# Patient Record
Sex: FEMALE | Race: BLACK OR AFRICAN AMERICAN | URBAN - METROPOLITAN AREA
[De-identification: names, ages, dates, MRNs, and addresses within clinical notes are randomized per-mention and may not be internally consistent; named-entity substitution may affect disease eponyms.]

---

## 2022-04-11 ENCOUNTER — HOSPITAL ENCOUNTER (EMERGENCY)
Age: 52
Discharge: HOME | End: 2022-04-12
Attending: EMERGENCY MEDICINE
Payer: COMMERCIAL

## 2022-04-11 ENCOUNTER — APPOINTMENT (OUTPATIENT)
Dept: CT IMAGING | Age: 52
End: 2022-04-11
Payer: COMMERCIAL

## 2022-04-11 DIAGNOSIS — N85.9 LESION OF UTERUS: ICD-10-CM

## 2022-04-11 DIAGNOSIS — N93.9 VAGINAL BLEEDING: Primary | ICD-10-CM

## 2022-04-11 LAB
BACTERIA: ABNORMAL /HPF
BILIRUBIN URINE: ABNORMAL
BLOOD, URINE: ABNORMAL
CLARITY: ABNORMAL
COLOR: YELLOW
EPITHELIAL CELLS, UA: 6 /HPF (ref 0–5)
FINE CASTS, UA: ABNORMAL /LPF (ref 0–2)
GLUCOSE URINE: NEGATIVE MG/DL
HCG(URINE) PREGNANCY TEST: NEGATIVE
HYALINE CASTS: ABNORMAL /LPF (ref 0–2)
INR BLD: 1.08 (ref 0.88–1.12)
KETONES, URINE: 15 MG/DL
LEUKOCYTE ESTERASE, URINE: ABNORMAL
MICROSCOPIC EXAMINATION: YES
NITRITE, URINE: NEGATIVE
PH UA: 6 (ref 5–8)
PROTEIN UA: 100 MG/DL
PROTHROMBIN TIME: 12.2 SEC (ref 9.9–12.7)
RBC UA: >900 /HPF (ref 0–4)
SPECIFIC GRAVITY UA: >=1.03 (ref 1–1.03)
URINE REFLEX TO CULTURE: YES
URINE TYPE: ABNORMAL
UROBILINOGEN, URINE: 0.2 E.U./DL
WBC UA: 40 /HPF (ref 0–5)

## 2022-04-11 PROCEDURE — 80048 BASIC METABOLIC PNL TOTAL CA: CPT

## 2022-04-11 PROCEDURE — 83690 ASSAY OF LIPASE: CPT

## 2022-04-11 PROCEDURE — 36415 COLL VENOUS BLD VENIPUNCTURE: CPT

## 2022-04-11 PROCEDURE — 80076 HEPATIC FUNCTION PANEL: CPT

## 2022-04-11 PROCEDURE — 81001 URINALYSIS AUTO W/SCOPE: CPT

## 2022-04-11 PROCEDURE — 99283 EMERGENCY DEPT VISIT LOW MDM: CPT

## 2022-04-11 PROCEDURE — 85025 COMPLETE CBC W/AUTO DIFF WBC: CPT

## 2022-04-11 PROCEDURE — 87086 URINE CULTURE/COLONY COUNT: CPT

## 2022-04-11 PROCEDURE — 85610 PROTHROMBIN TIME: CPT

## 2022-04-11 PROCEDURE — 84703 CHORIONIC GONADOTROPIN ASSAY: CPT

## 2022-04-12 ENCOUNTER — APPOINTMENT (OUTPATIENT)
Dept: CT IMAGING | Age: 52
End: 2022-04-12
Payer: COMMERCIAL

## 2022-04-12 VITALS
HEART RATE: 76 BPM | RESPIRATION RATE: 21 BRPM | OXYGEN SATURATION: 100 % | TEMPERATURE: 98.6 F | HEIGHT: 69 IN | DIASTOLIC BLOOD PRESSURE: 86 MMHG | WEIGHT: 157.41 LBS | SYSTOLIC BLOOD PRESSURE: 129 MMHG | BODY MASS INDEX: 23.31 KG/M2

## 2022-04-12 LAB
ALBUMIN SERPL-MCNC: 4.3 G/DL (ref 3.4–5)
ALP BLD-CCNC: 50 U/L (ref 40–129)
ALT SERPL-CCNC: 9 U/L (ref 10–40)
ANION GAP SERPL CALCULATED.3IONS-SCNC: 16 MMOL/L (ref 3–16)
AST SERPL-CCNC: 13 U/L (ref 15–37)
BASOPHILS ABSOLUTE: 0 K/UL (ref 0–0.2)
BASOPHILS RELATIVE PERCENT: 0.2 %
BILIRUB SERPL-MCNC: 0.6 MG/DL (ref 0–1)
BILIRUBIN DIRECT: <0.2 MG/DL (ref 0–0.3)
BILIRUBIN, INDIRECT: ABNORMAL MG/DL (ref 0–1)
BUN BLDV-MCNC: 11 MG/DL (ref 7–20)
CALCIUM SERPL-MCNC: 9 MG/DL (ref 8.3–10.6)
CHLORIDE BLD-SCNC: 105 MMOL/L (ref 99–110)
CO2: 18 MMOL/L (ref 21–32)
CREAT SERPL-MCNC: 1.1 MG/DL (ref 0.6–1.1)
EOSINOPHILS ABSOLUTE: 0 K/UL (ref 0–0.6)
EOSINOPHILS RELATIVE PERCENT: 0 %
GFR AFRICAN AMERICAN: >60
GFR NON-AFRICAN AMERICAN: 53
GLUCOSE BLD-MCNC: 111 MG/DL (ref 70–99)
HCT VFR BLD CALC: 36 % (ref 36–48)
HEMATOLOGY PATH CONSULT: NO
HEMOGLOBIN: 11.7 G/DL (ref 12–16)
LIPASE: 15 U/L (ref 13–60)
LYMPHOCYTES ABSOLUTE: 0.6 K/UL (ref 1–5.1)
LYMPHOCYTES RELATIVE PERCENT: 5.6 %
MCH RBC QN AUTO: 22 PG (ref 26–34)
MCHC RBC AUTO-ENTMCNC: 32.5 G/DL (ref 31–36)
MCV RBC AUTO: 67.9 FL (ref 80–100)
MONOCYTES ABSOLUTE: 0.3 K/UL (ref 0–1.3)
MONOCYTES RELATIVE PERCENT: 2.5 %
NEUTROPHILS ABSOLUTE: 10 K/UL (ref 1.7–7.7)
NEUTROPHILS RELATIVE PERCENT: 91.7 %
PDW BLD-RTO: 26.2 % (ref 12.4–15.4)
PLATELET # BLD: 208 K/UL (ref 135–450)
PMV BLD AUTO: 9 FL (ref 5–10.5)
POTASSIUM REFLEX MAGNESIUM: 3.9 MMOL/L (ref 3.5–5.1)
RBC # BLD: 5.3 M/UL (ref 4–5.2)
SODIUM BLD-SCNC: 139 MMOL/L (ref 136–145)
TOTAL PROTEIN: 7.9 G/DL (ref 6.4–8.2)
URINE CULTURE, ROUTINE: NORMAL
WBC # BLD: 11 K/UL (ref 4–11)

## 2022-04-12 PROCEDURE — 74177 CT ABD & PELVIS W/CONTRAST: CPT

## 2022-04-12 PROCEDURE — 6360000004 HC RX CONTRAST MEDICATION: Performed by: EMERGENCY MEDICINE

## 2022-04-12 RX ADMIN — IOPAMIDOL 75 ML: 755 INJECTION, SOLUTION INTRAVENOUS at 00:48

## 2022-04-12 ASSESSMENT — ENCOUNTER SYMPTOMS
COLOR CHANGE: 0
EYE ITCHING: 0
EYE DISCHARGE: 0
CONSTIPATION: 0
ABDOMINAL PAIN: 1
SHORTNESS OF BREATH: 0
VOMITING: 0
COUGH: 0

## 2022-04-12 NOTE — ED PROVIDER NOTES
[address] [facility]      Pt Name: Randy Burton  MRN: [id number]  [place] 1970  Date of evaluation: 4/11/2022  Provider: An Ruiz MD    CHIEF COMPLAINT       Chief Complaint   Patient presents with    Vaginal Bleeding       HISTORY OF PRESENT ILLNESS    Choco Lemus is a 54 y.o. female who presents to the emergency department with abdominal pain and vaginal bleeding. Has been going on for months. States she could not take it anymore so came to the emergency room. Has an appointment with her OB/GYN tomorrow. Has a scheduled hysterectomy for polyps and fibroids in June. Pain currently 3 out of 10 crampy in nature in her lower abdomen. No fevers or chills. No urinary complaints. No back pain. No nausea or vomiting. No constipation or diarrhea. Bleeding is intermittent. Currently denies bleeding. No other associated symptoms. Nursing Notes were reviewed. Including nursing noted for FM, Surgical History, Past Medical History, Social History, vitals, and allergies; agree with all. REVIEW OF SYSTEMS       Review of Systems   Constitutional: Negative for diaphoresis and unexpected weight change. HENT: Negative for congestion and dental problem. Eyes: Negative for discharge and itching. Respiratory: Negative for cough and shortness of breath. Cardiovascular: Negative for chest pain and leg swelling. Gastrointestinal: Positive for abdominal pain. Negative for constipation and vomiting. Endocrine: Negative for cold intolerance and heat intolerance. Genitourinary: Positive for vaginal bleeding. Negative for vaginal discharge and vaginal pain. Musculoskeletal: Negative for neck pain and neck stiffness. Skin: Negative for color change and pallor. Neurological: Negative for tremors and weakness. Psychiatric/Behavioral: Negative for agitation and behavioral problems.        Except as noted above the remainder of the review of systems was reviewed and negative. PAST MEDICAL HISTORY     Past Medical History:   Diagnosis Date    Anemia     Depression 9/16/2014    Headache(784.0)     Seizures ([address]        SURGICAL HISTORY       Past Surgical History:   Procedure Laterality Date    KNEE SURGERY Left     TUBAL LIGATION         CURRENT MEDICATIONS       Previous Medications    ALBUTEROL SULFATE  (90 BASE) MCG/ACT INHALER    Inhale 2 puffs into the lungs every 6 hours as needed for Wheezing    FE FUM-FA-B [id number] (CENTRATEX PO)    Take 1 capsule by mouth 2 times daily    LAMOTRIGINE (LAMICTAL) 100 MG TABLET    Take 250 mg by mouth 2 times daily    LORAZEPAM (ATIVAN) 1 MG TABLET    Take 1 mg by mouth every 6 hours as needed (seizure/anxiety)     MAGNESIUM OXIDE (MAG-OX) 400 MG TABLET    Take 400 mg by mouth 2 times daily    OXCARBAZEPINE (TRILEPTAL) 600 MG TABLET    Take 1,200 mg by mouth 2 times daily    PANTOPRAZOLE (PROTONIX) 40 MG TABLET    Take 1 tablet by mouth every morning (before breakfast)    RIBOFLAVIN 100 MG CAPS    Take 100 mg by mouth 2 times daily       ALLERGIES     Patient has no known allergies.     FAMILY HISTORY        Family History   Problem Relation Age of Onset    Arthritis Mother     Early Death Mother     High Blood Pressure Father     Diabetes Maternal Aunt     Arthritis Maternal Grandmother     Asthma Maternal Grandmother     Heart Disease Maternal Grandmother     High Blood Pressure Maternal Grandmother     Asthma Maternal Grandfather     Heart Disease Maternal Grandfather     High Blood Pressure Maternal Grandfather     Heart Disease Paternal Grandmother     Heart Disease Paternal Grandfather     Cancer Maternal Cousin     Learning Disabilities Maternal Cousin     Cancer Paternal Cousin        SOCIAL HISTORY       Social History     Socioeconomic History    Marital status: Single     Spouse name: Not on file    Number of children: Not on file    Years of education: Not on file    Highest education level: Not on file   Occupational History    Not on file   Tobacco Use    Smoking status: Never Smoker    Smokeless tobacco: Never Used   Substance and Sexual Activity    Alcohol use: No     Alcohol/week: 0.0 standard drinks    Drug use: No    Sexual activity: Yes     Partners: Male   Other Topics Concern    Not on file   Social History Narrative    Not on file     Social Determinants of Health     Financial Resource Strain:     Difficulty of Paying Living Expenses: Not on file   Food Insecurity:     Worried About Running Out of Food in the Last Year: Not on file    Leyla of Food in the Last Year: Not on file   Transportation Needs:     Lack of Transportation (Medical): Not on file    Lack of Transportation (Non-Medical):  Not on file   Physical Activity:     Days of Exercise per Week: Not on file    Minutes of Exercise per Session: Not on file   Stress:     Feeling of Stress : Not on file   Social Connections:     Frequency of Communication with Friends and Family: Not on file    Frequency of Social Gatherings with Friends and Family: Not on file    Attends Baptism Services: Not on file    Active Member of [address] [place] [address] or Organizations: Not on file    Attends Club or Organization Meetings: Not on file    Marital Status: Not on file   Intimate Partner Violence:     Fear of Current or Ex-Partner: Not on file    Emotionally Abused: Not on file    Physically Abused: Not on file    Sexually Abused: Not on file   Housing Stability:     Unable to Pay for Housing in the Last Year: Not on file    Number of [place] in the Last Year: Not on file    Unstable Housing in the Last Year: Not on file       PHYSICAL EXAM       ED Triage Vitals   BP Temp Temp src Pulse Resp SpO2 Height Weight   04/11/22 2216 04/11/22 2216 -- 04/11/22 2216 04/12/22 0101 04/11/22 2216 04/11/22 2216 04/11/22 2216   129/86 98.6 °F (37 °C)  89 21 98 % 5' 9\" (1.753 m) 157 lb 6.5 oz (71.4 kg)       Physical Exam  Vitals and nursing note reviewed. Constitutional:       General: She is not in acute distress. Appearance: She is well-developed. She is not ill-appearing, toxic-appearing or diaphoretic. HENT:      Head: Normocephalic and atraumatic. Right Ear: External ear normal.      Left Ear: External ear normal.   Eyes:      General:         Right eye: No discharge. Left eye: No discharge. Conjunctiva/sclera: Conjunctivae normal.      Pupils: Pupils are equal, round, and reactive to light. Cardiovascular:      Rate and Rhythm: Normal rate and regular rhythm. Heart sounds: No murmur heard. Pulmonary:      Effort: Pulmonary effort is normal. No respiratory distress. Breath sounds: Normal breath sounds. No wheezing or rales. Abdominal:      General: Bowel sounds are normal. There is no distension. Palpations: Abdomen is soft. There is no mass. Tenderness: There is no abdominal tenderness. There is no guarding or rebound. Genitourinary:     Comments: Deferred  Musculoskeletal:         General: No deformity. Normal range of motion. Cervical back: Normal range of motion and neck supple. Skin:     General: Skin is warm. Findings: No erythema or rash. Neurological:      Mental Status: She is alert and oriented to person, place, and time. She is not disoriented. Cranial Nerves: No cranial nerve deficit. Motor: No atrophy or abnormal muscle tone. Coordination: Coordination normal.   Psychiatric:         Behavior: Behavior normal.         Thought Content:  Thought content normal.         DIAGNOSTIC RESULTS     EKG: All EKG's are interpreted by the Emergency Department Physician who either signs or Co-signs this chart in the absence of acardiologist.    None    RADIOLOGY:   Non-plain film images such as CT, Ultrasoundand MRI are read by the radiologist. Plain radiographic images are visualized and preliminarily interpreted by the emergency physician with the below findings:    Impression   Heterogeneous and markedly hyperdense uterus, which may be due to presence of   fibroids; however, other neoplasms such as lymphoma is sarcoma cannot be   excluded.  Pelvic ultrasound is recommended for further evaluation.       Suggestion of a 0.5 cm stone in the incompletely distended urinary bladder. ED BEDSIDE ULTRASOUND:   Performed by ED Physician - none    LABS:  Labs Reviewed   CBC WITH AUTO DIFFERENTIAL - Abnormal; Notable for the following components:       Result Value    RBC 5.30 (*)     Hemoglobin 11.7 (*)     MCV 67.9 (*)     MCH 22.0 (*)     RDW 26.2 (*)     Neutrophils Absolute 10.0 (*)     Lymphocytes Absolute 0.6 (*)     All other components within normal limits   BASIC METABOLIC PANEL W/ REFLEX TO MG FOR LOW K - Abnormal; Notable for the following components:    CO2 18 (*)     Glucose 111 (*)     GFR Non- 53 (*)     All other components within normal limits   HEPATIC FUNCTION PANEL - Abnormal; Notable for the following components:    ALT 9 (*)     AST 13 (*)     All other components within normal limits   URINALYSIS WITH REFLEX TO CULTURE - Abnormal; Notable for the following components:    Clarity, UA TURBID (*)     Bilirubin Urine SMALL (*)     Ketones, Urine 15 (*)     Blood, Urine LARGE (*)     Protein,  (*)     Leukocyte Esterase, Urine TRACE (*)     All other components within normal limits   MICROSCOPIC URINALYSIS - Abnormal; Notable for the following components:    Hyaline Casts, UA 3-5 (*)     Bacteria, UA 1+ (*)     WBC, UA 40 (*)     RBC, UA >900 (*)     Epithelial Cells, UA 6 (*)     All other components within normal limits   CULTURE, URINE   PROTIME-INR   LIPASE   PREGNANCY, URINE       All other labs were withinnormal range or not returned as of this dictation.     EMERGENCY DEPARTMENT COURSE and DIFFERENTIAL DIAGNOSIS/MDM:     PMH, Surgical Hx, FH, Social Hx reviewed by myself (ETOH usage, Tobacco usage, Drug usage reviewed by myself, no pertinent Hx)- No Pertinent Hx     Old records were reviewed by me    59-year-old with vaginal bleeding and vaginal pain. CT shows uterine lesion concerning for fibroid versus mass. Patient had a ablation in the past.  She has an appointment with her OB/GYN tomorrow she has a scheduled hysterectomy in the next 2 months. Her hemoglobin is stable. Minimal vaginal bleeding at this time. Vitals reassuring. Discussed calling her OB/GYN tomorrow/going to her appointment for further outpatient assessment. Strict return precautions. I estimate there is LOW risk for Sepsis, MI, Stroke, Tamponade, PTX, Toxicity or other life threatening etiology thus I consider the discharge disposition reasonable. The patient is at low risk for mortality based on demographic, history and clinical factors. Given the best available information and clinical assessment, I estimate the risk of hospitalization to be greater than risk of treatment at home. I have explained to the patient that the risk could rapidly change, given precautions for return and instructions. Explained to patient that the risk for mortality is low based on demographic, history and clinical factors. I discussed with patient the results of evaluation in the ED, diagnosis, care, and prognosis. The plan is to discharge to home. Patient is in agreement with plan and questions have been answered. I also discussed with patient the reasons which may require a return visit and the importance of follow-up care. The patient is well-appearing, nontoxic, and improved at the time of discharge. Patient agrees to call to arrange follow-up care as directed. Patient understands to return immediately for worsening/change in symptoms. CRITICAL CARE TIME   Total Critical Caretime was 12 minutes, excluding separately reportable procedures.   There was a high probability of clinically significant/life threatening deterioration in the patient's condition which required my urgent intervention. PROCEDURES:  Unlessotherwise noted below, none    FINAL IMPRESSION      1. Vaginal bleeding    2.  Lesion of uterus          DISPOSITION/PLAN   DISPOSITION Decision To Discharge 04/12/2022 01:22:39 AM    PATIENT REFERRED TO:  Please see OB in 1-2 days  Recommend follow up 1-2 days with your OB/GYN          DISCHARGE MEDICATIONS:  New Prescriptions    No medications on file          (Please note that portions ofthis note were completed with a voice recognition program.  Efforts were made to edit the dictations but occasionally words are mis-transcribed.)    Chiqui Ghotra MD(electronically signed)  Attending Emergency Physician        Chiqui Ghotra MD  04/12/22 0527

## 2022-11-06 ENCOUNTER — HOSPITAL ENCOUNTER (INPATIENT)
Age: 52
LOS: 1 days | Discharge: ANOTHER HOSPITAL | DRG: 053 | End: 2022-11-06
Attending: STUDENT IN AN ORGANIZED HEALTH CARE EDUCATION/TRAINING PROGRAM | Admitting: FAMILY MEDICINE
Payer: MEDICAID

## 2022-11-06 ENCOUNTER — APPOINTMENT (OUTPATIENT)
Dept: CT IMAGING | Age: 52
DRG: 053 | End: 2022-11-06
Payer: MEDICAID

## 2022-11-06 DIAGNOSIS — R51.9 NONINTRACTABLE HEADACHE, UNSPECIFIED CHRONICITY PATTERN, UNSPECIFIED HEADACHE TYPE: Primary | ICD-10-CM

## 2022-11-06 DIAGNOSIS — R20.0 LEFT ARM NUMBNESS: ICD-10-CM

## 2022-11-06 PROBLEM — R56.9 SEIZURES (HCC): Status: ACTIVE | Noted: 2022-11-06

## 2022-11-06 LAB
A/G RATIO: 1.6 (ref 1.1–2.2)
ALBUMIN SERPL-MCNC: 4.1 G/DL (ref 3.4–5)
ALP BLD-CCNC: 56 U/L (ref 40–129)
ALT SERPL-CCNC: <5 U/L (ref 10–40)
ANION GAP SERPL CALCULATED.3IONS-SCNC: 12 MMOL/L (ref 3–16)
AST SERPL-CCNC: 9 U/L (ref 15–37)
BACTERIA: ABNORMAL /HPF
BASOPHILS ABSOLUTE: 0 K/UL (ref 0–0.2)
BASOPHILS RELATIVE PERCENT: 0.6 %
BILIRUB SERPL-MCNC: 0.3 MG/DL (ref 0–1)
BILIRUBIN URINE: NEGATIVE
BLOOD, URINE: ABNORMAL
BUN BLDV-MCNC: 11 MG/DL (ref 7–20)
CALCIUM SERPL-MCNC: 8.8 MG/DL (ref 8.3–10.6)
CHLORIDE BLD-SCNC: 106 MMOL/L (ref 99–110)
CLARITY: CLEAR
CO2: 23 MMOL/L (ref 21–32)
COLOR: YELLOW
CREAT SERPL-MCNC: 1.1 MG/DL (ref 0.6–1.1)
D DIMER: 0.44 UG/ML FEU (ref 0–0.6)
EKG ATRIAL RATE: 74 BPM
EKG DIAGNOSIS: NORMAL
EKG P AXIS: 48 DEGREES
EKG P-R INTERVAL: 148 MS
EKG Q-T INTERVAL: 406 MS
EKG QRS DURATION: 80 MS
EKG QTC CALCULATION (BAZETT): 450 MS
EKG R AXIS: 56 DEGREES
EKG T AXIS: 71 DEGREES
EKG VENTRICULAR RATE: 74 BPM
EOSINOPHILS ABSOLUTE: 0.1 K/UL (ref 0–0.6)
EOSINOPHILS RELATIVE PERCENT: 1.4 %
EPITHELIAL CELLS, UA: 7 /HPF (ref 0–5)
GFR SERPL CREATININE-BSD FRML MDRD: >60 ML/MIN/{1.73_M2}
GLUCOSE BLD-MCNC: 74 MG/DL (ref 70–99)
GLUCOSE BLD-MCNC: 75 MG/DL (ref 70–99)
GLUCOSE BLD-MCNC: 87 MG/DL (ref 70–99)
GLUCOSE BLD-MCNC: 94 MG/DL (ref 70–99)
GLUCOSE URINE: NEGATIVE MG/DL
HCT VFR BLD CALC: 35.5 % (ref 36–48)
HEMOGLOBIN: 11.3 G/DL (ref 12–16)
HYALINE CASTS: 0 /LPF (ref 0–8)
KEPPRA DOSE AMT: ABNORMAL
KEPPRA: 76.7 UG/ML (ref 6–46)
KETONES, URINE: NEGATIVE MG/DL
LEUKOCYTE ESTERASE, URINE: NEGATIVE
LYMPHOCYTES ABSOLUTE: 2.4 K/UL (ref 1–5.1)
LYMPHOCYTES RELATIVE PERCENT: 41.3 %
MCH RBC QN AUTO: 22.5 PG (ref 26–34)
MCHC RBC AUTO-ENTMCNC: 31.7 G/DL (ref 31–36)
MCV RBC AUTO: 71 FL (ref 80–100)
MICROSCOPIC EXAMINATION: YES
MONOCYTES ABSOLUTE: 0.5 K/UL (ref 0–1.3)
MONOCYTES RELATIVE PERCENT: 8.1 %
NEUTROPHILS ABSOLUTE: 2.9 K/UL (ref 1.7–7.7)
NEUTROPHILS RELATIVE PERCENT: 48.6 %
NITRITE, URINE: NEGATIVE
PDW BLD-RTO: 17 % (ref 12.4–15.4)
PERFORMED ON: NORMAL
PH UA: 6.5 (ref 5–8)
PLATELET # BLD: 202 K/UL (ref 135–450)
PMV BLD AUTO: 8.4 FL (ref 5–10.5)
POTASSIUM SERPL-SCNC: 3.6 MMOL/L (ref 3.5–5.1)
PROTEIN UA: ABNORMAL MG/DL
RBC # BLD: 5.01 M/UL (ref 4–5.2)
RBC UA: 14 /HPF (ref 0–4)
SODIUM BLD-SCNC: 141 MMOL/L (ref 136–145)
SPECIFIC GRAVITY UA: 1.07 (ref 1–1.03)
TOTAL PROTEIN: 6.7 G/DL (ref 6.4–8.2)
TROPONIN: <0.01 NG/ML
URINE REFLEX TO CULTURE: ABNORMAL
URINE TYPE: ABNORMAL
UROBILINOGEN, URINE: 1 E.U./DL
WBC # BLD: 5.9 K/UL (ref 4–11)
WBC UA: 2 /HPF (ref 0–5)

## 2022-11-06 PROCEDURE — 36415 COLL VENOUS BLD VENIPUNCTURE: CPT

## 2022-11-06 PROCEDURE — 96374 THER/PROPH/DIAG INJ IV PUSH: CPT

## 2022-11-06 PROCEDURE — 6360000002 HC RX W HCPCS: Performed by: PHYSICIAN ASSISTANT

## 2022-11-06 PROCEDURE — 93005 ELECTROCARDIOGRAM TRACING: CPT | Performed by: PHYSICIAN ASSISTANT

## 2022-11-06 PROCEDURE — 6360000004 HC RX CONTRAST MEDICATION: Performed by: PHYSICIAN ASSISTANT

## 2022-11-06 PROCEDURE — 6370000000 HC RX 637 (ALT 250 FOR IP): Performed by: FAMILY MEDICINE

## 2022-11-06 PROCEDURE — 81001 URINALYSIS AUTO W/SCOPE: CPT

## 2022-11-06 PROCEDURE — 80053 COMPREHEN METABOLIC PANEL: CPT

## 2022-11-06 PROCEDURE — 94760 N-INVAS EAR/PLS OXIMETRY 1: CPT

## 2022-11-06 PROCEDURE — 70450 CT HEAD/BRAIN W/O DYE: CPT

## 2022-11-06 PROCEDURE — 6370000000 HC RX 637 (ALT 250 FOR IP): Performed by: STUDENT IN AN ORGANIZED HEALTH CARE EDUCATION/TRAINING PROGRAM

## 2022-11-06 PROCEDURE — 84484 ASSAY OF TROPONIN QUANT: CPT

## 2022-11-06 PROCEDURE — 85379 FIBRIN DEGRADATION QUANT: CPT

## 2022-11-06 PROCEDURE — 2580000003 HC RX 258: Performed by: PHYSICIAN ASSISTANT

## 2022-11-06 PROCEDURE — 99291 CRITICAL CARE FIRST HOUR: CPT | Performed by: INTERNAL MEDICINE

## 2022-11-06 PROCEDURE — 6360000002 HC RX W HCPCS: Performed by: INTERNAL MEDICINE

## 2022-11-06 PROCEDURE — 6360000002 HC RX W HCPCS: Performed by: STUDENT IN AN ORGANIZED HEALTH CARE EDUCATION/TRAINING PROGRAM

## 2022-11-06 PROCEDURE — 99285 EMERGENCY DEPT VISIT HI MDM: CPT

## 2022-11-06 PROCEDURE — 80175 DRUG SCREEN QUAN LAMOTRIGINE: CPT

## 2022-11-06 PROCEDURE — 80177 DRUG SCRN QUAN LEVETIRACETAM: CPT

## 2022-11-06 PROCEDURE — G0378 HOSPITAL OBSERVATION PER HR: HCPCS

## 2022-11-06 PROCEDURE — 93010 ELECTROCARDIOGRAM REPORT: CPT | Performed by: INTERNAL MEDICINE

## 2022-11-06 PROCEDURE — 85025 COMPLETE CBC W/AUTO DIFF WBC: CPT

## 2022-11-06 PROCEDURE — 2000000000 HC ICU R&B

## 2022-11-06 PROCEDURE — 96375 TX/PRO/DX INJ NEW DRUG ADDON: CPT

## 2022-11-06 PROCEDURE — 70496 CT ANGIOGRAPHY HEAD: CPT

## 2022-11-06 RX ORDER — DIPHENHYDRAMINE HYDROCHLORIDE 50 MG/ML
12.5 INJECTION INTRAMUSCULAR; INTRAVENOUS ONCE
Status: COMPLETED | OUTPATIENT
Start: 2022-11-06 | End: 2022-11-06

## 2022-11-06 RX ORDER — LORAZEPAM 2 MG/ML
2 INJECTION INTRAMUSCULAR ONCE
Status: COMPLETED | OUTPATIENT
Start: 2022-11-06 | End: 2022-11-06

## 2022-11-06 RX ORDER — CLOBAZAM 10 MG/1
10 TABLET ORAL NIGHTLY
Status: DISCONTINUED | OUTPATIENT
Start: 2022-11-06 | End: 2022-11-06 | Stop reason: SDUPTHER

## 2022-11-06 RX ORDER — ACETAMINOPHEN 650 MG/1
650 SUPPOSITORY RECTAL EVERY 4 HOURS PRN
Status: DISCONTINUED | OUTPATIENT
Start: 2022-11-06 | End: 2022-11-07 | Stop reason: HOSPADM

## 2022-11-06 RX ORDER — KETOROLAC TROMETHAMINE 15 MG/ML
15 INJECTION, SOLUTION INTRAMUSCULAR; INTRAVENOUS ONCE
Status: DISCONTINUED | OUTPATIENT
Start: 2022-11-06 | End: 2022-11-07 | Stop reason: HOSPADM

## 2022-11-06 RX ORDER — ENOXAPARIN SODIUM 100 MG/ML
40 INJECTION SUBCUTANEOUS DAILY
Status: DISCONTINUED | OUTPATIENT
Start: 2022-11-06 | End: 2022-11-07 | Stop reason: HOSPADM

## 2022-11-06 RX ORDER — METOCLOPRAMIDE HYDROCHLORIDE 5 MG/ML
5 INJECTION INTRAMUSCULAR; INTRAVENOUS ONCE
Status: COMPLETED | OUTPATIENT
Start: 2022-11-06 | End: 2022-11-06

## 2022-11-06 RX ORDER — LORAZEPAM 2 MG/ML
1 INJECTION INTRAMUSCULAR ONCE
Status: COMPLETED | OUTPATIENT
Start: 2022-11-06 | End: 2022-11-06

## 2022-11-06 RX ORDER — DULOXETIN HYDROCHLORIDE 30 MG/1
30 CAPSULE, DELAYED RELEASE ORAL DAILY
COMMUNITY

## 2022-11-06 RX ORDER — POLYETHYLENE GLYCOL 3350 17 G/17G
17 POWDER, FOR SOLUTION ORAL DAILY PRN
Status: DISCONTINUED | OUTPATIENT
Start: 2022-11-06 | End: 2022-11-07 | Stop reason: HOSPADM

## 2022-11-06 RX ORDER — MAGNESIUM SULFATE 1 G/100ML
1000 INJECTION INTRAVENOUS ONCE
Status: DISCONTINUED | OUTPATIENT
Start: 2022-11-06 | End: 2022-11-07 | Stop reason: HOSPADM

## 2022-11-06 RX ORDER — LAMOTRIGINE 100 MG/1
300 TABLET ORAL 2 TIMES DAILY
Status: DISCONTINUED | OUTPATIENT
Start: 2022-11-06 | End: 2022-11-07 | Stop reason: HOSPADM

## 2022-11-06 RX ORDER — PANTOPRAZOLE SODIUM 40 MG/1
40 TABLET, DELAYED RELEASE ORAL
Status: DISCONTINUED | OUTPATIENT
Start: 2022-11-06 | End: 2022-11-07 | Stop reason: HOSPADM

## 2022-11-06 RX ORDER — BACLOFEN 10 MG/1
5 TABLET ORAL 2 TIMES DAILY
Status: DISCONTINUED | OUTPATIENT
Start: 2022-11-06 | End: 2022-11-07 | Stop reason: HOSPADM

## 2022-11-06 RX ORDER — ATORVASTATIN CALCIUM 80 MG/1
80 TABLET, FILM COATED ORAL NIGHTLY
Status: DISCONTINUED | OUTPATIENT
Start: 2022-11-06 | End: 2022-11-07 | Stop reason: HOSPADM

## 2022-11-06 RX ORDER — CLOBAZAM 10 MG/1
10 TABLET ORAL NIGHTLY
COMMUNITY

## 2022-11-06 RX ORDER — ACETAMINOPHEN 500 MG
1000 TABLET ORAL EVERY 8 HOURS PRN
Status: DISCONTINUED | OUTPATIENT
Start: 2022-11-06 | End: 2022-11-07 | Stop reason: HOSPADM

## 2022-11-06 RX ORDER — 0.9 % SODIUM CHLORIDE 0.9 %
1000 INTRAVENOUS SOLUTION INTRAVENOUS ONCE
Status: COMPLETED | OUTPATIENT
Start: 2022-11-06 | End: 2022-11-06

## 2022-11-06 RX ORDER — LORAZEPAM 1 MG/1
1 TABLET ORAL EVERY 6 HOURS PRN
Status: DISCONTINUED | OUTPATIENT
Start: 2022-11-06 | End: 2022-11-06

## 2022-11-06 RX ORDER — CLOBAZAM 10 MG/1
10 TABLET ORAL NIGHTLY
Status: DISCONTINUED | OUTPATIENT
Start: 2022-11-06 | End: 2022-11-07 | Stop reason: HOSPADM

## 2022-11-06 RX ORDER — LORAZEPAM 2 MG/ML
0.5 INJECTION INTRAMUSCULAR EVERY 4 HOURS PRN
Status: DISCONTINUED | OUTPATIENT
Start: 2022-11-06 | End: 2022-11-06

## 2022-11-06 RX ORDER — LORAZEPAM 2 MG/ML
2 INJECTION INTRAMUSCULAR EVERY 4 HOURS PRN
Status: DISCONTINUED | OUTPATIENT
Start: 2022-11-06 | End: 2022-11-07 | Stop reason: HOSPADM

## 2022-11-06 RX ORDER — ACETAMINOPHEN 325 MG/1
650 TABLET ORAL EVERY 4 HOURS PRN
Status: DISCONTINUED | OUTPATIENT
Start: 2022-11-06 | End: 2022-11-06 | Stop reason: SDUPTHER

## 2022-11-06 RX ORDER — LEVETIRACETAM 500 MG/1
2000 TABLET ORAL 2 TIMES DAILY
Status: DISCONTINUED | OUTPATIENT
Start: 2022-11-06 | End: 2022-11-07 | Stop reason: HOSPADM

## 2022-11-06 RX ORDER — OXCARBAZEPINE 300 MG/1
1200 TABLET, FILM COATED ORAL 2 TIMES DAILY
Status: DISCONTINUED | OUTPATIENT
Start: 2022-11-06 | End: 2022-11-06

## 2022-11-06 RX ORDER — ASPIRIN 300 MG/1
300 SUPPOSITORY RECTAL DAILY
Status: DISCONTINUED | OUTPATIENT
Start: 2022-11-06 | End: 2022-11-07 | Stop reason: HOSPADM

## 2022-11-06 RX ORDER — LEVETIRACETAM 1000 MG/1
2000 TABLET ORAL 2 TIMES DAILY
COMMUNITY

## 2022-11-06 RX ORDER — VITAMIN B COMPLEX
2000 TABLET ORAL DAILY
Status: DISCONTINUED | OUTPATIENT
Start: 2022-11-06 | End: 2022-11-07 | Stop reason: HOSPADM

## 2022-11-06 RX ORDER — MULTIVITAMIN WITH IRON
1 TABLET ORAL 2 TIMES DAILY
Status: DISCONTINUED | OUTPATIENT
Start: 2022-11-06 | End: 2022-11-07 | Stop reason: HOSPADM

## 2022-11-06 RX ORDER — LANOLIN ALCOHOL/MO/W.PET/CERES
1000 CREAM (GRAM) TOPICAL DAILY
Status: DISCONTINUED | OUTPATIENT
Start: 2022-11-06 | End: 2022-11-07 | Stop reason: HOSPADM

## 2022-11-06 RX ORDER — ONDANSETRON 2 MG/ML
4 INJECTION INTRAMUSCULAR; INTRAVENOUS EVERY 6 HOURS PRN
Status: DISCONTINUED | OUTPATIENT
Start: 2022-11-06 | End: 2022-11-07 | Stop reason: HOSPADM

## 2022-11-06 RX ORDER — DULOXETIN HYDROCHLORIDE 30 MG/1
30 CAPSULE, DELAYED RELEASE ORAL DAILY
Status: DISCONTINUED | OUTPATIENT
Start: 2022-11-06 | End: 2022-11-07 | Stop reason: HOSPADM

## 2022-11-06 RX ORDER — ASPIRIN 81 MG/1
81 TABLET ORAL DAILY
Status: DISCONTINUED | OUTPATIENT
Start: 2022-11-06 | End: 2022-11-07 | Stop reason: HOSPADM

## 2022-11-06 RX ADMIN — LORAZEPAM 1 MG: 2 INJECTION INTRAMUSCULAR; INTRAVENOUS at 01:00

## 2022-11-06 RX ADMIN — LEVETIRACETAM 2000 MG: 500 TABLET, FILM COATED ORAL at 07:55

## 2022-11-06 RX ADMIN — DULOXETINE HYDROCHLORIDE 30 MG: 30 CAPSULE, DELAYED RELEASE ORAL at 07:56

## 2022-11-06 RX ADMIN — LAMOTRIGINE 300 MG: 100 TABLET ORAL at 20:51

## 2022-11-06 RX ADMIN — BACLOFEN 5 MG: 10 TABLET ORAL at 20:40

## 2022-11-06 RX ADMIN — METOCLOPRAMIDE 5 MG: 5 INJECTION, SOLUTION INTRAMUSCULAR; INTRAVENOUS at 01:00

## 2022-11-06 RX ADMIN — ACETAMINOPHEN 1000 MG: 500 TABLET ORAL at 20:55

## 2022-11-06 RX ADMIN — ATORVASTATIN CALCIUM 80 MG: 80 TABLET, FILM COATED ORAL at 20:40

## 2022-11-06 RX ADMIN — LORAZEPAM 2 MG: 2 INJECTION INTRAMUSCULAR; INTRAVENOUS at 10:58

## 2022-11-06 RX ADMIN — SODIUM CHLORIDE 1000 ML: 9 INJECTION, SOLUTION INTRAVENOUS at 00:56

## 2022-11-06 RX ADMIN — LAMOTRIGINE 300 MG: 100 TABLET ORAL at 07:56

## 2022-11-06 RX ADMIN — IOPAMIDOL 75 ML: 755 INJECTION, SOLUTION INTRAVENOUS at 01:16

## 2022-11-06 RX ADMIN — CLOBAZAM 10 MG: 10 TABLET ORAL at 20:40

## 2022-11-06 RX ADMIN — LORAZEPAM 2 MG: 2 INJECTION INTRAMUSCULAR; INTRAVENOUS at 17:19

## 2022-11-06 RX ADMIN — LEVETIRACETAM 2000 MG: 500 TABLET, FILM COATED ORAL at 20:42

## 2022-11-06 RX ADMIN — ASPIRIN 81 MG: 81 TABLET, COATED ORAL at 07:55

## 2022-11-06 RX ADMIN — THERA TABS 1 TABLET: TAB at 20:41

## 2022-11-06 RX ADMIN — BACLOFEN 5 MG: 10 TABLET ORAL at 14:35

## 2022-11-06 RX ADMIN — DIPHENHYDRAMINE HYDROCHLORIDE 12.5 MG: 50 INJECTION, SOLUTION INTRAMUSCULAR; INTRAVENOUS at 01:01

## 2022-11-06 ASSESSMENT — PAIN DESCRIPTION - DESCRIPTORS
DESCRIPTORS: THROBBING
DESCRIPTORS: ACHING

## 2022-11-06 ASSESSMENT — ENCOUNTER SYMPTOMS
VOMITING: 0
COLOR CHANGE: 0
SHORTNESS OF BREATH: 0
ABDOMINAL PAIN: 0

## 2022-11-06 ASSESSMENT — PAIN - FUNCTIONAL ASSESSMENT
PAIN_FUNCTIONAL_ASSESSMENT: 0-10
PAIN_FUNCTIONAL_ASSESSMENT: NONE - DENIES PAIN

## 2022-11-06 ASSESSMENT — PAIN DESCRIPTION - PAIN TYPE: TYPE: CHRONIC PAIN

## 2022-11-06 ASSESSMENT — PAIN DESCRIPTION - LOCATION
LOCATION: HEAD
LOCATION: HEAD;NECK;SHOULDER;ARM

## 2022-11-06 ASSESSMENT — PAIN SCALES - GENERAL
PAINLEVEL_OUTOF10: 0
PAINLEVEL_OUTOF10: 6
PAINLEVEL_OUTOF10: 10

## 2022-11-06 ASSESSMENT — PAIN DESCRIPTION - FREQUENCY: FREQUENCY: CONTINUOUS

## 2022-11-06 NOTE — ED PROVIDER NOTES
[address] [facility]      Pt Name: Charmayne Browning  MRN: [id number]  [place] 1970  Date of evaluation: 11/6/2022  Provider: МАРИЯ Crowe    This patient was not seen and evaluated by the attending physician No att. providers found. CHIEF COMPLAINT       Chief Complaint   Patient presents with    Headache    Arm Pain       CRITICAL CARE TIME   I performed a total Critical Care time of 15 minutes, excluding separately reportable procedures. There was a high probability of clinically significant/life threatening deterioration in the patient's condition which required my urgent intervention. Not limited to multiple reexaminations, discussions with attending physician and consultants. HISTORY OF PRESENT ILLNESS  (Location/Symptom, Timing/Onset, Context/Setting, Quality, Duration, Modifying Factors, Severity.)   Choco Hernandez is a 50 y.o. female who presents to the emergency department via EMS from home where she lives alone. I also spoke on speaker phone with the patient's Tarethan Tawanda \"Tash\" per the patient's request as she is very familiar with the patient's medical history. The patient is on Lamictal and Keppra and Zonegran with a history of epilepsy. The patient's aunt states that sometimes she has seizures and does not know it and a lot of time she has seizures at night. She will have a headache prior to the seizure and/or after the seizures. She tells me the last seizure she observed was about 2 weeks ago describes \"generalized jerking\" typical of her prior seizures. Patient tells me that since Tuesday she has been having a headache and numb tingling sensation in left upper extremity. She tells me the pain in her head which is described as at the top of her head is typical of her prior migraines but has not had one in years. She denies pain in the shoulder or chest.  She is not short of breath. She denies feeling weakness.   No prior cardiac history. No history of diabetes or hyperlipidemia. No history of hypertension. Nursing Notes were reviewed and I agree. REVIEW OF SYSTEMS    (2-9 systems for level 4, 10 or more for level 5)     Review of Systems   Constitutional:  Negative for chills and fever. Respiratory:  Negative for shortness of breath. Cardiovascular:  Negative for chest pain. Gastrointestinal:  Negative for abdominal pain and vomiting. Musculoskeletal:  Positive for arthralgias. Negative for neck stiffness. Skin:  Negative for color change, rash and wound. Neurological:  Positive for numbness and headaches. Negative for seizures and weakness. Psychiatric/Behavioral:  Negative for agitation, behavioral problems and confusion. Except as noted above the remainder of the review of systems was reviewed and negative. PAST MEDICAL HISTORY         Diagnosis Date    Anemia     Depression 9/16/2014    Headache(784.0)     Seizures ([facility] [address]        SURGICAL HISTORY           Procedure Laterality Date    KNEE SURGERY Left     TUBAL LIGATION         CURRENT MEDICATIONS       Previous Medications    ALBUTEROL SULFATE  (90 BASE) MCG/ACT INHALER    Inhale 2 puffs into the lungs every 6 hours as needed for Wheezing    FE FUM-FA-B [id number] (CENTRATEX PO)    Take 1 capsule by mouth 2 times daily    LAMOTRIGINE (LAMICTAL) 100 MG TABLET    Take 250 mg by mouth 2 times daily    LORAZEPAM (ATIVAN) 1 MG TABLET    Take 1 mg by mouth every 6 hours as needed (seizure/anxiety)     MAGNESIUM OXIDE (MAG-OX) 400 MG TABLET    Take 400 mg by mouth 2 times daily    OXCARBAZEPINE (TRILEPTAL) 600 MG TABLET    Take 1,200 mg by mouth 2 times daily    PANTOPRAZOLE (PROTONIX) 40 MG TABLET    Take 1 tablet by mouth every morning (before breakfast)    RIBOFLAVIN 100 MG CAPS    Take 100 mg by mouth 2 times daily       ALLERGIES     Patient has no known allergies.     FAMILY HISTORY           Problem Relation Age of Onset Arthritis Mother     Early Death Mother     High Blood Pressure Father     Diabetes Maternal Aunt     Arthritis Maternal Grandmother     Asthma Maternal Grandmother     Heart Disease Maternal Grandmother     High Blood Pressure Maternal Grandmother     Asthma Maternal Grandfather     Heart Disease Maternal Grandfather     High Blood Pressure Maternal Grandfather     Heart Disease Paternal Grandmother     Heart Disease Paternal Grandfather     Cancer Maternal Cousin     Learning Disabilities Maternal Cousin     Cancer Paternal Cousin      Family Status   Relation Name Status    Mother  (Not Specified)    Father  (Not Specified)    MAunt  (Not Specified)    MGM  (Not Specified)    MGF  (Not Specified)    PGM  (Not Specified)    PGF  (Not Specified)    MCousin  (Not Specified)    PCousin  (Not Specified)        SOCIAL HISTORY      reports that she has never smoked. She has never used smokeless tobacco. She reports that she does not drink alcohol and does not use drugs. PHYSICAL EXAM    (up to 7 for level 4, 8 or more for level 5)     ED Triage Vitals [11/06/22 0015]   BP Temp Temp Source Heart Rate Resp SpO2 Height Weight   110/74 98.4 °F (36.9 °C) Oral 85 16 99 % -- 157 lb 9 oz (71.5 kg)       Physical Exam  Vitals and nursing note reviewed. Constitutional:       Appearance: Normal appearance. She is not ill-appearing. HENT:      Head: Normocephalic and atraumatic. Mouth/Throat:      Mouth: Mucous membranes are moist.   Eyes:      Pupils: Pupils are equal, round, and reactive to light. Cardiovascular:      Rate and Rhythm: Normal rate. Pulses: Normal pulses. Pulmonary:      Effort: Pulmonary effort is normal. No respiratory distress. Musculoskeletal:         General: Tenderness present. No swelling. Normal range of motion. Cervical back: Normal range of motion. Skin:     General: Skin is warm. Neurological:      General: No focal deficit present.       Mental Status: She is alert and oriented to person, place, and time. Cranial Nerves: No cranial nerve deficit. Sensory: Sensory deficit present. Motor: No weakness. Gait: Gait normal.   Psychiatric:         Mood and Affect: Mood normal.         Behavior: Behavior normal.       DIAGNOSTIC RESULTS     EKG: All EKG's are interpreted by МАРИЯ Acosta in the absence of a cardiologist.    EKG interpreted by myself - please refer to attending physician's note for complete EKG interpretation:  No evidence of acute ischemia or injury. RADIOLOGY:   Non-plain film images such as CT, Ultrasound and MRI are read by the radiologist. Plain radiographic images are visualized and preliminarily interpreted by МРАИЯ Acosta with the below findings:    Reviewed radiologist's interpretation. Interpretation per the Radiologist below, if available at the time of this note:    CTA HEAD NECK W CONTRAST   Final Result   No hemodynamically significant stenosis or occlusion in the large arteries of   the head and neck. CT HEAD WO CONTRAST   Final Result   Status post craniotomy along the left temporoparietal region and partial   resection of left temporal bone with interval removal of the left temporal   lobe anteriorly and residual encephalomalacia throughout the middle cranial   fossa anteriorly. 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 No acute intracranial abnormality seen.                LABS:  Labs Reviewed   CBC WITH AUTO DIFFERENTIAL - Abnormal; Notable for the following components:       Result Value    Hemoglobin 11.3 (*)     Hematocrit 35.5 (*)     MCV 71.0 (*)     MCH 22.5 (*)     RDW 17.0 (*)     All other components within normal limits   COMPREHENSIVE METABOLIC PANEL - Abnormal; Notable for the following components:    ALT <5 (*)     AST 9 (*)     All other components within normal limits   LEVETIRACETAM LEVEL   TROPONIN   D-DIMER, QUANTITATIVE   LAMOTRIGINE LEVEL   URINALYSIS WITH REFLEX TO CULTURE       All other labs were within normal range or not returned as of this dictation. EMERGENCY DEPARTMENT COURSE and DIFFERENTIAL DIAGNOSIS/MDM:   Vitals:    Vitals:    11/06/22 0015 11/06/22 0149   BP: 110/74 97/72   Pulse: 85    Resp: 16 19   Temp: 98.4 °F (36.9 °C)    TempSrc: Oral    SpO2: 99% 100%   Weight: 157 lb 9 oz (71.5 kg)      Afebrile not tachycardic not hypoxic. Having head pain and numbness in the left extremity for 6 days. There is no weakness noted good  strength. No other symptoms noted. She has history of seizure disorder. History of \"memory issues. \"  I spoke to the patient's and several times on the phone with the patient present to get the history and give her updates. Patient was given aspirin and agreeable to admission. CONSULTS:  IP CONSULT TO HOSPITALIST    PROCEDURES:  Procedures      FINAL IMPRESSION      1. Nonintractable headache, unspecified chronicity pattern, unspecified headache type    2. Left arm numbness          DISPOSITION/PLAN   DISPOSITION Decision To Admit 11/06/2022 02:32:34 AM      PATIENT REFERRED TO:  No follow-up provider specified.     DISCHARGE MEDICATIONS:  New Prescriptions    No medications on file       (Please note that portions of this note were completed with a voice recognition program.  Efforts were made to edit the dictations but occasionally words are mis-transcribed.)    Ashwini Henriquez [place]  11/06/22 [id number]

## 2022-11-06 NOTE — FLOWSHEET NOTE
Report called to [facility] [address] [facility]. Phone number [phone]. Transport set up for 9 PM with [facility]. Family aware.

## 2022-11-06 NOTE — SIGNIFICANT EVENT
Saw patient during rapid response around 11 AM today. Rapid response was called due to patient's unresponsiveness. In the room, she was noted to be unresponsive, not responsive to painful stimuli. There was rotatory eye movement, extreme rigidity, noted to be having seizure. She had at least 3 seizure episodes this morning, receiving at least 2 doses of IV Ativan. Neurologist was present in room at the time of seizure event. Eventually, she became seizure-free. Neurologist recommended transfer for continuous EEG.   Discussed with Dr. Tone Flores.

## 2022-11-06 NOTE — FLOWSHEET NOTE
Pt now in ICU, room 2124. Pt drowsy but awakens easily. Oriented x4, neuro assessment completed, no deficits noted. Pt's only complaint is left arm tingling, but that is not new onset. Dr Mina Amato notified of patient's transfer and status. MD at bedside assessing patient. Plan of care reviewed. Pt oriented to room, call light in reach. Bed alarm on. Will monitor.

## 2022-11-06 NOTE — FLOWSHEET NOTE
Pt had another seizure lasting several minutes. Family at bedside. 2 mg Ativan given. Pt's vitals stable throughout, sats 100%, maintained airway. Pt gradually came around, opened eyes and holding conversations afterwards. BG level 74. Informed Dr Maryjane Ramon (hospitalist coverage), no new orders. Perfect Serve also sent to Dr Tone Flores to inform. Will monitor closely.

## 2022-11-06 NOTE — H&P
Hospital Medicine History & Physical      PCP: Alejandra Root MD    Date of Admission: 11/6/2022    Date of Service: Pt seen/examined, with 1st encounter, on 11/6/22 and Admitted to Inpatient     Chief Complaint:  seizure      History Of Present Illness: The patient is a [address] y.o. female who presents to [facility] with acute left arm numbness and tingling. Has eilepsy, on lamictal, keppra, zonegran but still has seizures. Often headaches coincide with seizures, either before or after. Last was 2 weeks ago with generalized tonic clonic activity. She has had a headache for 6 days  She follows with  neuro. Last seen 9/28/22 at which point her aeds were adjusted. ED workup  Vitals: wnl  Pertinent labs: wnl, ua appears contaminated rather than infected  Imaging: ct head, cta head neck are nonacute, encephalomalacia   When I saw here she was awake and talking, mental status was at baseline. Sevreral hours after admission she had 3 seizures, all resolved with ativan. Neurology was actually in the room at the time. She was then transferred to the icu for monitoring. Past Medical History:        Diagnosis Date    Anemia     Depression 9/16/2014    Headache(784.0)     Seizures ([address]        Past Surgical History:        Procedure Laterality Date    KNEE SURGERY Left     TUBAL LIGATION         Medications Prior to Admission:    Prior to Admission medications    Medication Sig Start Date End Date Taking? Authorizing Provider   levETIRAcetam (KEPPRA) 1000 MG tablet Take 2,000 mg by mouth 2 times daily   Yes Historical Provider, MD   cloBAZam (ONFI) 10 MG TABS tablet Take 10 mg by mouth at bedtime.    Yes Historical Provider, MD   DULoxetine (CYMBALTA) 30 MG extended release capsule Take 30 mg by mouth daily   Yes Historical Provider, MD   cyanocobalamin 1000 MCG tablet Take 1,000 mcg by mouth daily   Yes Historical Provider, MD   vitamin D (CHOLECALCIFEROL) 25 MCG (1000 UT) TABS tablet Take 2,000 Units by mouth daily   Yes Historical Provider, MD   lamoTRIgine (LAMICTAL) 150 MG tablet Take 300 mg by mouth 2 times daily    Historical Provider, MD   magnesium oxide (MAG-OX) 400 MG tablet Take 400 mg by mouth 2 times daily    Historical Provider, MD   albuterol sulfate  (90 BASE) MCG/ACT inhaler Inhale 2 puffs into the lungs every 6 hours as needed for Wheezing    Historical Provider, MD   Fe Fum-FA-B Ojg-L-Ki-Mg-Mn-Cu (CENTRATEX PO) Take 1 capsule by mouth 2 times daily    Historical Provider, MD       Allergies:  Patient has no known allergies. Social History:      TOBACCO:   reports that she has never smoked. She has never used smokeless tobacco.  ETOH:   reports no history of alcohol use. Family History:          Problem Relation Age of Onset    Arthritis Mother     Early Death Mother     High Blood Pressure Father     Diabetes Maternal Aunt     Arthritis Maternal Grandmother     Asthma Maternal Grandmother     Heart Disease Maternal Grandmother     High Blood Pressure Maternal Grandmother     Asthma Maternal Grandfather     Heart Disease Maternal Grandfather     High Blood Pressure Maternal Grandfather     Heart Disease Paternal Grandmother     Heart Disease Paternal Grandfather     Cancer Maternal Cousin     Learning Disabilities Maternal Cousin     Cancer Paternal Cousin        REVIEW OF SYSTEMS:   Positive for seizures and as noted in the HPI. All other systems reviewed and negative. PHYSICAL EXAM:    /63   Pulse 80   Temp 98.7 °F (37.1 °C) (Oral)   Resp 16   Ht 5' 9\" (1.753 m)   Wt 157 lb 3 oz (71.3 kg)   SpO2 98%   BMI 23.21 kg/m²     General appearance: No apparent distress, cooperative. HEENT Normal cephalic, atraumatic without obvious deformity. PERRL. EOM. Conjunctivae/corneas clear. Neck: Supple, No jugular venous distention/bruits. Trachea midline   Lungs: Clear to auscultation, bilaterally without Rales/Wheezes/Rhonchi without accessory muscle use. Heart: Regular rate and rhythm with Normal S1/S2 without murmurs, rubs or gallops  Abdomen: Soft, non-tender or non-distended without rigidity or guarding and positive bowel sounds all four quadrants. Extremities: No clubbing, cyanosis, or edema bilaterally. Skin: Skin color, texture, turgor normal.  No rashes or lesions. Neurologic: Alert and oriented X 3, neurovascularly intact with sensory/motor intact upper extremities/lower extremities, bilaterally. Grossly non-focal.  Mental status: Alert, oriented, thought content appropriate. Peripheral Pulses: +3 Easily felt, not easily obliterated with pressure  Cap refill  +2 sec    CBC   Recent Labs     11/06/22 0043   WBC 5.9   HGB 11.3*   HCT 35.5*         RENAL  Recent Labs     11/06/22 0043      K 3.6      CO2 23   BUN 11   CREATININE 1.1     LFT'S  Recent Labs     11/06/22 0043   AST 9*   ALT <5*   BILITOT 0.3   ALKPHOS 56     COAG  No results for input(s): INR in the last 72 hours.   CARDIAC ENZYMES  Recent Labs     11/06/22 0043   TROPONINI <0.01       U/A:    Lab Results   Component Value Date/Time    COLORU Yellow 04/11/2022 11:05 PM    WBCUA 40 04/11/2022 11:05 PM    RBCUA >900 04/11/2022 11:05 PM    MUCUS 2+ 06/18/2016 09:07 PM    BACTERIA 1+ 04/11/2022 11:05 PM    CLARITYU TURBID 04/11/2022 11:05 PM    SPECGRAV >=1.030 04/11/2022 11:05 PM    LEUKOCYTESUR TRACE 04/11/2022 11:05 PM    BLOODU LARGE 04/11/2022 11:05 PM    GLUCOSEU Negative 04/11/2022 11:05 PM       ABG  No results found for: BPX0GEL, BEART, S3ASZJBJ, PHART, THGBART, AFJ5DQQ, PO2ART, IXK0DMB        Active Hospital Problems    Diagnosis Date Noted    Left arm numbness [R20.0] 11/06/2022     Priority: Medium         PHYSICIANS CERTIFICATION:    I certify that Choco Babb is expected to be hospitalized for more than 2 midnights based on the following assessment and plan:      ASSESSMENT/PLAN:    Seizures  - concern for status  - transfer to [facility] vs . [facility] prefers she go to  as her neurologist is there  - if she has another seizure will start versed gtt and intubate  - keppra, lamictal, clobazam   - levels are therapeutic    Left arm numbness  Intractable migraine  - possible brachial plexopathy? This may be a migraine as well  - head CT neg for acute pathology  - MRI brain and brachial plexus  - neurology consulted    Migraine  - add muscle relaxer as her neck and upper back muscles spasms, mg, antiemetics    DVT Prophylaxis: lovenox  Code Status: Full Code    Dispo - in pt, transfer to icu. Awaiting a bed at , although this has been very hard to do in the past     Susie Ramírez, DO    Thank you Edwina Jenkins MD for the opportunity to be involved in this patient's care. If you have any questions or concerns please feel free to contact me at [phone].

## 2022-11-06 NOTE — CONSULTS
Pulmonary critical care consult Note     Patient's name:  Mirta Willis Record Number: [id number]  Patient's account/billing number: [de-identified]  Patient's YOB: 1970  Age: 50 y.o. 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 History of migraine headache. Plan:  Antiepileptic medications adjusted by neurology, keppra, lamictal, clobazam   Will give Ativan as needed. If she had additional seizure we will start IV Versed and intubated. Aspiration precautions, keep NPO. Pending transfer to [facility]. for continuous EEG monitoring. HISTORY OF PRESENT ILLNESS:   Mr./Ms. Choco Quach is a 50 y.o. -American lady with past medical history stated below significant for history of seizure disorder presented with headache and arm numbness/pain, her seizure disorder is described as refractory epilepsy status post left anterior temporal lobectomy. Was transferred to the ICU status post seizure tonic status post Ativan currently postictal but good mental status able to protect airways, this is the second seizure within 30 minutes. Past Medical History:        Diagnosis Date    Anemia     Depression 9/16/2014    Headache(784.0)     Seizures ([facility] [address]        Past Surgical History:        Procedure Laterality Date    KNEE SURGERY Left     TUBAL LIGATION         Allergies:    No Known Allergies      Home Meds:   Prior to Admission medications    Medication Sig Start Date End Date Taking?  Authorizing Provider   levETIRAcetam (KEPPRA) 1000 MG tablet Take 2,000 mg by mouth 2 times daily   Yes Historical Provider, MD   cloBAZam (ONFI) 10 MG TABS tablet Take 10 mg by mouth at bedtime. Yes Historical Provider, MD   DULoxetine (CYMBALTA) 30 MG extended release capsule Take 30 mg by mouth daily   Yes Historical Provider, MD   cyanocobalamin 1000 MCG tablet Take 1,000 mcg by mouth daily   Yes Historical Provider, MD   vitamin D (CHOLECALCIFEROL) 25 MCG (1000 UT) TABS tablet Take 2,000 Units by mouth daily   Yes Historical Provider, MD   lamoTRIgine (LAMICTAL) 150 MG tablet Take 300 mg by mouth 2 times daily    Historical Provider, MD   magnesium oxide (MAG-OX) 400 MG tablet Take 400 mg by mouth 2 times daily    Historical Provider, MD   albuterol sulfate  (90 BASE) MCG/ACT inhaler Inhale 2 puffs into the lungs every 6 hours as needed for Wheezing    Historical Provider, MD   Fe Fum-FA-B Gfm-H-Bq-Mg-Mn-Cu (CENTRATEX PO) Take 1 capsule by mouth 2 times daily    Historical Provider, MD       Family History:       Problem Relation Age of Onset    Arthritis Mother     Early Death Mother     High Blood Pressure Father     Diabetes Maternal Aunt     Arthritis Maternal Grandmother     Asthma Maternal Grandmother     Heart Disease Maternal Grandmother     High Blood Pressure Maternal Grandmother     Asthma Maternal Grandfather     Heart Disease Maternal Grandfather     High Blood Pressure Maternal Grandfather     Heart Disease Paternal Grandmother     Heart Disease Paternal Grandfather     Cancer Maternal Cousin     Learning Disabilities Maternal Cousin     Cancer Paternal Cousin          Social History:   TOBACCO:   reports that she has never smoked. She has never used smokeless tobacco.  ETOH:   reports no history of alcohol use. DRUGS:  reports no history of drug use. REVIEW OF SYSTEMS:  Review of Systems -   Postictal but able to wake up and answer simple questions. Physical Exam:    Vitals: /73   Pulse 85   Temp 98.7 °F (37.1 °C) (Oral)   Resp 16   Ht 5' 9\" (1.753 m)   Wt 157 lb 3 oz (71.3 kg)   SpO2 97%   BMI 23.21 kg/m²     Last Body weight:    Wt Readings from Last 3 Encounters:   11/06/22 157 lb 3 oz (71.3 kg)   04/11/22 157 lb 6.5 oz (71.4 kg)   10/14/17 140 lb 14 oz (63.9 kg)       Body Mass Index : Body mass index is 23.21 kg/m². Intake and Output summary:   Intake/Output Summary (Last 24 hours) at 11/6/2022 1406  Last data filed at 11/6/2022 0719  Gross per 24 hour   Intake 100 ml   Output --   Net 100 ml       Physical Examination:     PHYSICAL EXAM:    Gen: Slightly drowsy  Eyes: PERRL. Anicteric sclera. No conjunctival injection. ENT: No discharge. Posterior oropharynx clear. External appearance of ears and nose normal.  Neck: Trachea midline. No mass, no lymphadenopathy    Resp: Clear to auscultation bilaterally  CV: Regular rate. Regular rhythm. No murmur or rub. No edema. GI: Soft, Non-tender. Non-distended. +BS  Skin: Warm, dry, w/o erythema. Lymph: No cervical or supraclavicular LAD. M/S: No cyanosis. No clubbing. Neuro:  CN 2-12 tested, no focal neurologic deficit. Moves all extremities  Psych: Awake and lethargic, oriented x3        Laboratory findings:-    CBC:   Recent Labs     11/06/22 0043   WBC 5.9   HGB 11.3*        BMP:    Recent Labs     11/06/22 0043      K 3.6      CO2 23   BUN 11   CREATININE 1.1   GLUCOSE 94     S. Calcium:  Recent Labs     11/06/22 0043   CALCIUM 8.8       S. Glucose:  Recent Labs     11/06/22  1053   POCGLU 87     Hepatic functions:   Recent Labs     11/06/22 0043   ALKPHOS 56   ALT <5*   AST 9*   PROT 6.7   BILITOT 0.3   LABALBU 4.1       Cardiac enzymes:  Recent Labs     11/06/22 0043   TROPONINI <0.01       Radiology Review:  Pertinent images / reports were reviewed as a part of this visit.         Critical care time 35 minutes        Jagdish Coburn MD, [facility]ARL[facility].            11/6/2022, 2:06 PM

## 2022-11-06 NOTE — PROGRESS NOTES
Patient transferred to ICU room 2124 via bed around 1135. Aunt and father updated by Dr. Olga Winston. Belongings sent with patient including keys, cell phone, , clothes.   Electronically signed by Angelo Valerio RN on 11/6/2022 at 11:44 AM

## 2022-11-06 NOTE — ED NOTES
Pt resting w monitor in place.    Pt encouraged to void for urine labs     Lynn Shone, RN  11/06/22 0306

## 2022-11-06 NOTE — CODE DOCUMENTATION
Rapid response called due to pt being found completely unresponsive and rigid by bedside RN. VSS. Neurologist and hospitalist at bedside. 2mg ativan administered. Pt is becoming more alert.

## 2022-11-06 NOTE — DISCHARGE SUMMARY
Hospital Medicine Discharge Summary    Patient: Jericho Powell     Gender: female  : 1970   Age: 50 y.o. MRN: [id number]    Code Status: Full Code     Primary Care Provider: Evans Myers MD    Admit Date: 2022   Discharge Date:  2022    Admitting Physician: Daphne Birmingham DO  Discharge Physician: Daphne Birmingham DO     Discharge Diagnoses: Active Hospital Problems    Diagnosis Date Noted    Left arm numbness [R20.0] 2022     Priority: Medium    Seizures ([address] [R56.9] 2022     Priority: Medium       Hospital Course:   a 50 y.o. female who presented to [facility] with acute left arm numbness and tingling. Has eilepsy, on lamictal, keppra, zonegran but still has seizures. Often headaches coincide with seizures, either before or after. Last was 2 weeks ago with generalized tonic clonic activity. She has had a headache for 6 days  She follows with  neuro. Last seen 22 at which point her aeds were adjusted. ED workup  Vitals: wnl  Pertinent labs: wnl, ua appears contaminated rather than infected  Imaging: ct head, cta head neck are nonacute, encephalomalacia     When I saw here she was awake and talking, mental status was at baseline. Sevreral hours after admission she had 3 seizures, all resolved with ativan. Neurology was actually in the room at the time. She was then transferred to the icu for monitoring. Work up completed, and improved with treatment as below. was discharged today in stable condition. Seizures  - concern for status  - transfered to [facility] vs . [facility] prefers she go to  as her neurologist is there  - keppra, lamictal, clobazam   - levels are therapeutic     Left arm numbness  Intractable migraine  - possible brachial plexopathy?  This may be a migraine as well  - head CT neg for acute pathology  - MRI brain and brachial plexus ordered, but nghia to [facility]  - neurology consulted     Migraine  - add muscle relaxer as her neck and upper back muscles spasms, mg, antiemetics     Disposition:  [facility]    Exam:     /75   Pulse 81   Temp 98 °F (36.7 °C) (Axillary)   Resp 18   Ht 5' 9\" (1.753 m)   Wt 157 lb 3 oz (71.3 kg)   SpO2 100%   BMI 23.21 kg/m²     General appearance:  No apparent distress, appears stated age and cooperative. HEENT:  Normal cephalic, atraumatic without obvious deformity. Pupils equal, round, and reactive to light. Extra ocular muscles intact. Conjunctivae/corneas clear. Neck: Supple, with full range of motion. No jugular venous distention. Trachea midline. Respiratory:  Normal respiratory effort. Clear to auscultation, bilaterally without Rales/Wheezes/Rhonchi. Cardiovascular:  Regular rate and rhythm with normal S1/S2 without murmurs, rubs or gallops. Abdomen: Soft, non-tender, non-distended with normal bowel sounds. Musculoskeletal:  No clubbing, cyanosis or edema bilaterally. Skin: Skin color, texture, turgor normal.  No rashes or lesions. Neurologic:  Neurovascularly intact without any focal sensory/motor deficits. Cranial nerves: II-XII intact, grossly non-focal.  Psychiatric:  Alert and oriented, thought content appropriate, normal insight    Consults:     IP CONSULT TO HOSPITALIST  IP CONSULT TO NEUROLOGY    Labs:  For convenience and continuity at follow-up the following most recent labs are provided:    Lab Results   Component Value Date/Time    WBC 5.9 11/06/2022 12:43 AM    HGB 11.3 11/06/2022 12:43 AM    HCT 35.5 11/06/2022 12:43 AM    MCV 71.0 11/06/2022 12:43 AM     11/06/2022 12:43 AM     11/06/2022 12:43 AM    K 3.6 11/06/2022 12:43 AM    K 3.9 04/11/2022 11:10 PM     11/06/2022 12:43 AM    CO2 23 11/06/2022 12:43 AM    BUN 11 11/06/2022 12:43 AM    CREATININE 1.1 11/06/2022 12:43 AM    CALCIUM 8.8 11/06/2022 12:43 AM    ALKPHOS 56 11/06/2022 12:43 AM    ALT <5 11/06/2022 12:43 AM    AST 9 11/06/2022 12:43 AM    BILITOT 0.3 11/06/2022 12:43 AM    BILIDIR <0.2 04/11/2022 11:10 PM LABALBU 4.1 11/06/2022 12:43 AM    LDLCALC 114 11/27/2013 11:12 AM    TRIG 53 11/27/2013 11:12 AM     Lab Results   Component Value Date    INR 1.08 04/11/2022       Radiology:  CTA HEAD NECK W CONTRAST   Final Result   No hemodynamically significant stenosis or occlusion in the large arteries of   the head and neck. CT HEAD WO CONTRAST   Final Result   Status post craniotomy along the left temporoparietal region and partial   resection of left temporal bone with interval removal of the left temporal   lobe anteriorly and residual encephalomalacia throughout the middle cranial   fossa anteriorly. 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 No acute intracranial abnormality seen. MRI brain without contrast    (Results Pending)       Discharge Medications:   Current Discharge Medication List        Current Discharge Medication List        Current Discharge Medication List        CONTINUE these medications which have NOT CHANGED    Details   levETIRAcetam (KEPPRA) 1000 MG tablet Take 2,000 mg by mouth 2 times daily      cloBAZam (ONFI) 10 MG TABS tablet Take 10 mg by mouth at bedtime.       DULoxetine (CYMBALTA) 30 MG extended release capsule Take 30 mg by mouth daily      cyanocobalamin 1000 MCG tablet Take 1,000 mcg by mouth daily      vitamin D (CHOLECALCIFEROL) 25 MCG (1000 UT) TABS tablet Take 2,000 Units by mouth daily      lamoTRIgine (LAMICTAL) 150 MG tablet Take 300 mg by mouth 2 times daily      magnesium oxide (MAG-OX) 400 MG tablet Take 400 mg by mouth 2 times daily      albuterol sulfate  (90 BASE) MCG/ACT inhaler Inhale 2 puffs into the lungs every 6 hours as needed for Wheezing      Fe Fum-FA-B Yfi-N-Br-Mg-Mn-Cu (CENTRATEX PO) Take 1 capsule by mouth 2 times daily           Current Discharge Medication List        STOP taking these medications       pantoprazole (PROTONIX) 40 MG tablet Comments:   Reason for Stopping:         LORazepam (ATIVAN) 1 MG tablet Comments:   Reason for Stopping:         OXcarbazepine (TRILEPTAL) 600 MG tablet Comments:   Reason for Stopping:         Riboflavin 100 MG CAPS Comments:   Reason for Stopping: Follow-up appointments:  two weeks    Provider Follow-up:    pcp    Condition at Discharge:  Stable    The patient was seen and examined on day of discharge and this discharge summary is in conjunction with any daily progress note from day of discharge. Time Spent on discharge is 45 minutes  in the examination, evaluation, counseling and review of medications and discharge plan. Signed:    Zabrina Jones DO   11/6/2022      Thank you oK Crocker MD for the opportunity to be involved in this patient's care. If you have any questions or concerns please feel free to contact me at [phone].

## 2022-11-06 NOTE — PROGRESS NOTES
Patient found in bed around [phone] with eyes closed and not responding. VSS. Glucose 87. Patients body rigid and rapid response called. Dr. Shelton Valdovinos and Teja Pew at bedside within minutes. 2mg ativan given and patient started responding. States her tongue hurts and may have bit her tongue.   Electronically signed by Elgin Nava RN on 11/6/2022 at 11:06 AM

## 2022-11-06 NOTE — PLAN OF CARE
Problem: Pain  Goal: Verbalizes/displays adequate comfort level or baseline comfort level  Outcome: Progressing     Problem: Neurosensory - Adult  Goal: Achieves stable or improved neurological status  Outcome: Progressing  Flowsheets (Taken 11/6/2022 0516)  Achieves stable or improved neurological status: Assess for and report changes in neurological status   NAME:  Choco Merino OF BIRTH:  1970  MEDICAL RECORD NUMBER:  [id number]  TODAYS DATE:  11/6/2022    Discussed personal risk factors for Stroke/TIA with patient/family, and ways to reduce the risk for a recurrent stroke. Patient's personal risk factors which were identified are:     [] Alcohol Abuse: check with your physician before any alcohol consumption. [] Atrial fibrillation: may cause blood clots. [] Drug Abuse: Seek help, talk with your doctor  [] Clotting Disorder  [] Diabetes  [x] Family history of stroke or heart disease  [x] High Blood Pressure/Hypertension: work with your physician. [x] High cholesterol: monitor cholesterol levels with your physician.   [] Overweight/Obesity: work with your physician for your ideal body weight. [x] Physical Inactivity: get regular exercise as directed by your physician. [x] Personal history of previous TIA or stroke  [] Poor Diet; decrease salt (sodium) in your diet, follow diet directed by physician. [x] Smoking: Cigarette/Cigar: stop smoking. Advised pt. that you can reduce your risk for stroke/TIA by modifying/controlling the risk factors that you have. Pt.advised to take the medications as prescribed, which will be detailed in the discharge instructions, and to not stop taking them without consulting their physician. In addition, pt. advised to maintain a healthy diet, exercise regularly and to not smoke. [facility]'s Stroke treatment and prevention, Managing your recovery  notebook  provided and/or reviewed  with patient/family.   The notebook includes, but not limited to, sections addressing warning signs & symptoms of a stroke, which are: sudden numbness or weakness especially on one side of the body, sudden confusion, difficulty speaking or understanding, sudden changes in vision, sudden dizziness or loss of balance/ coordination, sudden severe headache, syncope and seizure. The need to call EMS ([phone]) immediately if signs & symptoms occur is emphasized . The notebook also provides education on Stroke community resources and stroke advocacy. The need for follow-up after discharge was highlighted with patient/family with them being able to repeat understanding of the importance of this.       Electronically signed by Lonny Koch RN on 11/6/2022 at 5:16 AM

## 2022-11-06 NOTE — PROGRESS NOTES
Physical Therapy  Initial Evaluation Attempt and Discharge    22    Name: Rainer Osei   : 1970    MRN: [id number]    PT order noted. Per nsg, Paul Garcia, pt moving independently in room. No therapy needs at this time.     Electronically signed by Shy Michael PT on 2022 at 9:44 AM

## 2022-11-06 NOTE — CODE DOCUMENTATION
Per Dr Olga Winston, send to [facility] for continuous monitoring, and transfer to ICU at this time for versed drip and may need continuous video eeg.

## 2022-11-06 NOTE — FLOWSHEET NOTE
Received call from transfer center. [facility] has a bed available, MICU room 2. Number received to call report. Waiting for transport time.

## 2022-11-06 NOTE — CONSULTS
Reason for Consult:  concern for stroke  Attending Physician:  Christian Velarde DO           HISTORY OF PRESENT ILLNESS:              The patient is a 50 y.o. female who presented with headache and arm pain on 22. Patient has reported history of epilepsy. She will have headache prior to seizure and/or after seizure. Last seizure witnessed by aunt was 2 weeks ago described as \"generalized jerking\" typical of her prior seizures. Since Tuesday she has been having headache and numb tingling sensation in LUE. Her headache is typical of her prior migraines but has not had one in years. Per Dr. 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 Her seizures in the past are described as:  1. Stare with unresponsiveness  2. Stare, unresponsive progressing to GTC  3. PNES-body turning, grunting    Plan at that visit  -reduce zonisamide to 100 mg at night for 1 week, then STOP  -start clobazam 10 m/2 tab at night for 1 week, then 1 tab at night  -continue levetiracetam 1000 m tabs AM and 2 tabs PM  -continue lamotrigine 150 m tabs AM and 2 tabs PM    When I went to see patient today, there was a rapid response. Patient was rigid in extremities and eyes were deviated to the left. Ativan 2 mg was given and had improved. She was a confused after first seizure like activity. She did have another event similar that resolved without more ativan. She did get back to point where could dial phone for me to talk with aunt. Then while I was in room she had another similar event concerning for seizure and ativan 2 mg was given. She improved quickly to point where could dial phone number for Aunt and could give me Sil Prieto number. This all happened in about a 30 minute period.   I communicated with patient's aunt and Amira Womack per patient request.    Past Medical History:    Past Medical History:   Diagnosis Date    Anemia     Depression 9/16/2014    Headache(784.0)     Seizures ([address]        Past Surgical History:    Past Surgical History:   Procedure Laterality Date    KNEE SURGERY Left     TUBAL LIGATION         Medications:   Current Facility-Administered Medications: pantoprazole (PROTONIX) tablet 40 mg, 40 mg, Oral, QAM AC  polyethylene glycol (GLYCOLAX) packet 17 g, 17 g, Oral, Daily PRN  enoxaparin (LOVENOX) injection 40 mg, 40 mg, SubCUTAneous, Daily  aspirin EC tablet 81 mg, 81 mg, Oral, Daily **OR** aspirin suppository 300 mg, 300 mg, Rectal, Daily  perflutren lipid microspheres (DEFINITY) injection 1.5 mL, 1.5 mL, IntraVENous, ONCE PRN  atorvastatin (LIPITOR) tablet 80 mg, 80 mg, Oral, Nightly  ondansetron (ZOFRAN) injection 4 mg, 4 mg, IntraVENous, Q6H PRN  acetaminophen (TYLENOL) tablet 650 mg, 650 mg, Oral, Q4H PRN **OR** acetaminophen (TYLENOL) suppository 650 mg, 650 mg, Rectal, Q4H PRN  LORazepam (ATIVAN) injection 0.5 mg, 0.5 mg, IntraVENous, Q4H PRN  lamoTRIgine (LAMICTAL) tablet 300 mg, 300 mg, Oral, BID  cloBAZam (ONFI) tablet 10 mg, 10 mg, Oral, Nightly  levETIRAcetam (KEPPRA) tablet 2,000 mg, 2,000 mg, Oral, BID  DULoxetine (CYMBALTA) extended release capsule 30 mg, 30 mg, Oral, Daily   Medications Prior to Admission: levETIRAcetam (KEPPRA) 1000 MG tablet, Take 2,000 mg by mouth 2 times daily  cloBAZam (ONFI) 10 MG TABS tablet, Take 10 mg by mouth at bedtime.   DULoxetine (CYMBALTA) 30 MG extended release capsule, Take 30 mg by mouth daily  cyanocobalamin 1000 MCG tablet, Take 1,000 mcg by mouth daily  vitamin D (CHOLECALCIFEROL) 25 MCG (1000 UT) TABS tablet, Take 2,000 Units by mouth daily  [DISCONTINUED] pantoprazole (PROTONIX) 40 MG tablet, Take 1 tablet by mouth every morning (before breakfast)  lamoTRIgine (LAMICTAL) 150 MG tablet, Take 300 mg by mouth 2 times daily  magnesium oxide (MAG-OX) 400 MG tablet, Take 400 mg by mouth 2 times daily  albuterol sulfate  (90 BASE) MCG/ACT inhaler, Inhale 2 puffs into the lungs every 6 hours as needed for Wheezing  [DISCONTINUED] LORazepam (ATIVAN) 1 MG tablet, Take 1 mg by mouth every 6 hours as needed (seizure/anxiety)   [DISCONTINUED] OXcarbazepine (TRILEPTAL) 600 MG tablet, Take 1,200 mg by mouth 2 times daily  [DISCONTINUED] Riboflavin 100 MG CAPS, Take 100 mg by mouth 2 times daily  Fe Fum-FA-B Jjg-[id number]-Mg-Mn-Cu (CENTRATEX PO), Take 1 capsule by mouth 2 times daily    Allergies:  Patient has no known allergies.     Social History:  Social History     Socioeconomic History    Marital status: Single     Spouse name: Not on file    Number of children: Not on file    Years of education: Not on file    Highest education level: Not on file   Occupational History    Not on file   Tobacco Use    Smoking status: Never    Smokeless tobacco: Never   Substance and Sexual Activity    Alcohol use: No     Alcohol/week: 0.0 standard drinks    Drug use: No    Sexual activity: Yes     Partners: Male   Other Topics Concern    Not on file   Social History Narrative    Not on file     Social Determinants of Health     Financial Resource Strain: Not on file   Food Insecurity: Not on file   Transportation Needs: Not on file   Physical Activity: Not on file   Stress: Not on file   Social Connections: Not on file   Intimate Partner Violence: Not on file   Housing Stability: Not on file     Social History     Tobacco Use   Smoking Status Never   Smokeless Tobacco Never     Social History     Substance and Sexual Activity   Drug Use No     Social History     Substance and Sexual Activity   Alcohol Use No    Alcohol/week: 0.0 standard drinks       Family History:   Family History   Problem Relation Age of Onset    Arthritis Mother     Early Death Mother     High Blood Pressure Father     Diabetes Maternal Aunt     Arthritis Maternal Grandmother     Asthma Maternal Grandmother     Heart Disease Maternal Grandmother     High Blood Pressure Maternal Grandmother     Asthma Maternal Grandfather     Heart Disease Maternal Grandfather     High Blood Pressure Maternal Grandfather     Heart Disease Paternal Grandmother     Heart Disease Paternal Grandfather     Cancer Maternal Cousin     Learning Disabilities Maternal Cousin     Cancer Paternal Cousin           ROS:  Relevant ROS per HPI    Exam:  Blood pressure 113/63, pulse 80, temperature 98.7 °F (37.1 °C), temperature source Oral, resp. rate 16, height 5' 9\" (1.753 m), weight 157 lb 3 oz (71.3 kg), SpO2 98 %. Constitutional    Vital signs: BP, HR, and RR reviewed     After last seizure like activity and right before being transferred to ICU. Patient was alert, oriented to person, place, time, would look right and left to command. Tongue midline to command. moved all 4 extremities with purpose. Studies:  CT H WO 11-6-2022  Impression   Status post craniotomy along the left temporoparietal region and partial   resection of left temporal bone with interval removal of the left temporal   lobe anteriorly and residual encephalomalacia throughout the middle cranial   fossa anteriorly. 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 No acute intracranial abnormality seen. CTA H and N 11-6-2022  Impression   No hemodynamically significant stenosis or occlusion in the large arteries of   the head and neck. Na 141  K 3.6  Cr 1.1  Glucose 94  Troponin <0.01  WBC 5.9  Hg 11.3      UA negative nitrite and leukocyte esterase    Impression:  Seizure like activity  History of epilepsy  History of PNES    Patient has a history of epilepsy and PNES. She had multiple events of seizure like activity concerning for epileptic seizure this morning. She was given a total of 4 mg of ativan on the floor.   Prior to leaving to ICU she was able to give me phone number and dial phone number. It was reported that she had left arm numbness when she came in. Focus on care today was seizure like activity. Recommendations:  - Transferred to facility with cvEEG monitoring. She has history of epilepsy and PNES. - If has more seizure like activity, start versed gtt. Will likely need intubation for this. - The patient should avoid driving for at least 3 months, seizure free with clearance from a physician, as well as refraining from activities that could be of danger to himself or others should her have another seizure such as swimming, bathing, operating heavy machinery, climbing heights or watching young children who do not have the ability to call for help if needed. - continue home medications of clobazam 10 mg QHS, keppra 2 g BID, and lamotrigine 300 mg BID. - at cvEEG monitoring facility can consider MRI Brain and Cervical spine for left arm numbness. - FU with  neurology.     Kimberly Flowers MD  Neurology

## 2022-11-06 NOTE — PROGRESS NOTES
4 Eyes Skin Assessment     NAME:  Choco Wesley  YOB: 1970  MEDICAL RECORD NUMBER:  [id number]    The patient is being assess for  Admission    I agree that 2 RN's have performed a thorough Head to Toe Skin Assessment on the patient. ALL assessment sites listed below have been assessed. Areas assessed by both nurses:    Head, Face, Ears, Shoulders, Back, Chest, Arms, Elbows, Hands, Sacrum. Buttock, Coccyx, Ischium, and Legs. Feet and Heels        Does the Patient have a Wound?  No noted wound(s)       Anthony Prevention initiated:  No   Wound Care Orders initiated:  No    Pressure Injury (Stage 3,4, Unstageable, DTI, NWPT, and Complex wounds) if present place referral/consult order under [de-identified] No    New and Established Ostomies if present place consult order under : No      Nurse 1 eSignature: Electronically signed by Batsheva Mandel RN on 11/6/22 at 5:14 AM EST    **SHARE this note so that the co-signing nurse is able to place an eSignature**    Nurse 2 eSignature: Electronically signed by Quentin Murray RN on 11/6/22 at 5:14 AM EST

## 2022-11-07 VITALS
HEART RATE: 72 BPM | OXYGEN SATURATION: 98 % | WEIGHT: 157.19 LBS | TEMPERATURE: 97.6 F | RESPIRATION RATE: 30 BRPM | SYSTOLIC BLOOD PRESSURE: 105 MMHG | HEIGHT: 69 IN | BODY MASS INDEX: 23.28 KG/M2 | DIASTOLIC BLOOD PRESSURE: 72 MMHG

## 2022-11-07 LAB — LAMOTRIGINE LEVEL: 10.4 UG/ML (ref 3–15)

## 2022-11-07 NOTE — PROGRESS NOTES
Transport here to take patient to [facility] MICU room 2. Report given to transport. Patient belongings gathered and sent with patient. LFA IV #20 and RFA #20 clean, dry and intact.

## 2022-11-07 NOTE — PROGRESS NOTES
Patient awake, alert and oriented x 4. VS stable. Patient states she is very hungry and hasn't eaten since this morning. Patient scheduled to be transported to  at 10pm for continuous EEG monitoring. There is an order for Regular diet. Clarified with Dr. Gabrielle pritchett for patient to eat before transport. Dinner tray heated up and patient tolerated dinner fine. Night time meds given, patient denies other needs at this time. Will continue to monitor.

## 2022-11-07 NOTE — PLAN OF CARE
Problem: Discharge Planning  Goal: Discharge to home or other facility with appropriate resources  Outcome: Progressing     Problem: Pain  Goal: Verbalizes/displays adequate comfort level or baseline comfort level  Outcome: Progressing  Flowsheets (Taken 11/6/2022 2055)  Verbalizes/displays adequate comfort level or baseline comfort level:   Encourage patient to monitor pain and request assistance   Assess pain using appropriate pain scale   Administer analgesics based on type and severity of pain and evaluate response   Implement non-pharmacological measures as appropriate and evaluate response   Notify Licensed Independent Practitioner if interventions unsuccessful or patient reports new pain     Problem: Neurosensory - Adult  Goal: Achieves stable or improved neurological status  Outcome: Progressing  Flowsheets (Taken 11/6/2022 2030)  Achieves stable or improved neurological status:   Assess for and report changes in neurological status   Initiate measures to prevent increased intracranial pressure   Maintain blood pressure and fluid volume within ordered parameters to optimize cerebral perfusion and minimize risk of hemorrhage   Monitor temperature, glucose, and sodium.  Initiate appropriate interventions as ordered

## 2023-02-21 NOTE — PROGRESS NOTES
DOS    74    Pt sawEvelyn [phone]  I called office and requested H.P to be faxed, they confirmed it was there and they will do so-today. UPDATE:Called Dr Evelyn Sorto spoke with Marleen Bronson , she will have doctor review and complete H&P done on 23. Await fax. Update: Spoke to NELLY Beckham at [facility] ( Dr Trina Daily office). Second request made to have the 23 PreOperative H&P completed and faxed to  [facility]. Dr Jani Mccallum office notified. Update: Noted 23 Telephone communication under encounters: Dr Jani Mccallum office requested H&P from Dr Walker Press office, Dr Trina Daily  responded stating it will be completed today and faxed to Dr Jani Mccallum office. Called Dr Jani Mccallum office, left 2 messages Marc Layton) for the office to fax the H&P to [facility] PAT today. Update: Fax note received, called Dr Jani Mccallum office-went to voicemail, message left to fax H&P today. Also recalled Dr Walker Press office, message left with call center to fax H&P today.

## 2023-02-21 NOTE — PROGRESS NOTES
C-diff Questionnaire:     * Admitted with diarrhea? [] YES    [x]  NO     *Prior history of C-Diff. In last 3 months? [] YES    [x]  NO     *Antibiotic use in the past 6-8 weeks? [x]  NO    []  YES      If yes, which: REASON_________________     *Prior hospitalization or nursing home in the last month? []  YES    [x]  NO     SAFETY FIRST. .call before you fall    [address] time___11_        Surgery time______1230__    Do not eat or drink anything after 12:00 midnight prior to your surgery. This includes water chewing gum, mints and ice chips- the Day of Surgery. You may brush your teeth and gargle the morning of your surgery, but do not swallow the water     Please see your family doctor/pediatrician for a history and physical and/or questions concerning medications. Bring any test results/reports from your physicians office. If you are under the care of a heart doctor or specialist doctor, please be aware that you may be asked to them for clearance    You may be asked to stop blood thinners such as Coumadin, Plavix, Fragmin, Lovenox, etc., or any anti-inflammatories such as:  Aspirin, Ibuprofen, Advil, Naproxen prior to your surgery. We also ask that you stop any OTC medications such as fish oil, vitamin E, glucosamine, garlic, Multivitamins, COQ 10, etc.    We ask that you do not smoke 24 hours prior to surgery  We ask that you do not  drink any alcoholic beverages 24 hours prior to surgery     You must make arrangements for a responsible adult to take you home after your surgery. For your safety you will not be allowed to leave alone or drive yourself home. Your surgery will be cancelled if you do not have a ride home. Also for your safety, it is strongly suggested that someone stay with you the first 24 hours after your surgery.      A parent or legal guardian must accompany a child scheduled for surgery and plan to stay at the hospital until the child is discharged. Please do not bring other children with you. For your comfort, please wear simple loose fitting clothing to the hospital.  Please do not bring valuables. Do not wear any make-up or nail polish on your fingers or toes. For your safety, please do not wear any jewelry or body piercing's on the day of surgery. All jewelry must be removed. If you have dentures, they will be removed before going to operating room. For your convenience, we will provide you with a container. If you wear contact lenses or glasses, they will be removed, please bring a case for them. If you have a living will and a durable power of  for healthcare, please bring in a copy. As part of our patient safety program to minimize surgical site infections, we ask you to do the following:    Please notify your surgeon if you develop any illness between         now and the day of your surgery. This includes a cough, cold, fever, sore throat, nausea,         or vomiting, and diarrhea, etc.   Please notify your surgeon if you experience dizziness, shortness         of breath or blurred vision between now and the time of your surgery. Do not shave your operative site 96 hours prior to surgery. For face and neck surgery, men may use an electric razor 48 hours   prior to surgery. You may shower the night before surgery or the morning of   your surgery with an antibacterial soap. You will need to bring a photo ID and insurance card     If you use a C-pap or Bi-pap machine, please bring your machine with you to the hospital     Our goal is to provide you with excellent care, therefore, visitors will be limited to so that we may focus on providing this care for you. Please contact your surgeon office, if you have any further questions.                  [facility] phone number:  [phone] PAT fax number:  [phone]    Please note these are generalized instructions for all surgical cases, you may be provided with more specific instructions according to your surgery.

## 2023-02-23 ENCOUNTER — ANESTHESIA EVENT (OUTPATIENT)
Dept: OPERATING ROOM | Age: 53
End: 2023-02-23
Payer: COMMERCIAL

## 2023-02-24 ENCOUNTER — HOSPITAL ENCOUNTER (OUTPATIENT)
Age: 53
Setting detail: OUTPATIENT SURGERY
Discharge: HOME | End: 2023-02-24
Attending: STUDENT IN AN ORGANIZED HEALTH CARE EDUCATION/TRAINING PROGRAM | Admitting: STUDENT IN AN ORGANIZED HEALTH CARE EDUCATION/TRAINING PROGRAM
Payer: COMMERCIAL

## 2023-02-24 ENCOUNTER — APPOINTMENT (OUTPATIENT)
Dept: GENERAL RADIOLOGY | Age: 53
End: 2023-02-24
Attending: STUDENT IN AN ORGANIZED HEALTH CARE EDUCATION/TRAINING PROGRAM
Payer: COMMERCIAL

## 2023-02-24 ENCOUNTER — ANESTHESIA (OUTPATIENT)
Dept: OPERATING ROOM | Age: 53
End: 2023-02-24
Payer: COMMERCIAL

## 2023-02-24 VITALS
TEMPERATURE: 97.8 F | WEIGHT: 161.27 LBS | DIASTOLIC BLOOD PRESSURE: 81 MMHG | RESPIRATION RATE: 16 BRPM | HEIGHT: 68 IN | OXYGEN SATURATION: 100 % | SYSTOLIC BLOOD PRESSURE: 124 MMHG | BODY MASS INDEX: 24.44 KG/M2 | HEART RATE: 80 BPM

## 2023-02-24 DIAGNOSIS — M20.12 ACQUIRED HALLUX VALGUS OF LEFT FOOT: Primary | ICD-10-CM

## 2023-02-24 PROCEDURE — 2709999900 HC NON-CHARGEABLE SUPPLY: Performed by: STUDENT IN AN ORGANIZED HEALTH CARE EDUCATION/TRAINING PROGRAM

## 2023-02-24 PROCEDURE — 7100000000 HC PACU RECOVERY - FIRST 15 MIN: Performed by: STUDENT IN AN ORGANIZED HEALTH CARE EDUCATION/TRAINING PROGRAM

## 2023-02-24 PROCEDURE — 2500000003 HC RX 250 WO HCPCS: Performed by: STUDENT IN AN ORGANIZED HEALTH CARE EDUCATION/TRAINING PROGRAM

## 2023-02-24 PROCEDURE — 6370000000 HC RX 637 (ALT 250 FOR IP): Performed by: STUDENT IN AN ORGANIZED HEALTH CARE EDUCATION/TRAINING PROGRAM

## 2023-02-24 PROCEDURE — 2580000003 HC RX 258: Performed by: ANESTHESIOLOGY

## 2023-02-24 PROCEDURE — A4217 STERILE WATER/SALINE, 500 ML: HCPCS | Performed by: STUDENT IN AN ORGANIZED HEALTH CARE EDUCATION/TRAINING PROGRAM

## 2023-02-24 PROCEDURE — 3209999900 FLUORO FOR SURGICAL PROCEDURES

## 2023-02-24 PROCEDURE — 2500000003 HC RX 250 WO HCPCS: Performed by: NURSE ANESTHETIST, CERTIFIED REGISTERED

## 2023-02-24 PROCEDURE — 2580000003 HC RX 258: Performed by: STUDENT IN AN ORGANIZED HEALTH CARE EDUCATION/TRAINING PROGRAM

## 2023-02-24 PROCEDURE — 3600000015 HC SURGERY LEVEL 5 ADDTL 15MIN: Performed by: STUDENT IN AN ORGANIZED HEALTH CARE EDUCATION/TRAINING PROGRAM

## 2023-02-24 PROCEDURE — 2720000010 HC SURG SUPPLY STERILE: Performed by: STUDENT IN AN ORGANIZED HEALTH CARE EDUCATION/TRAINING PROGRAM

## 2023-02-24 PROCEDURE — C1769 GUIDE WIRE: HCPCS | Performed by: STUDENT IN AN ORGANIZED HEALTH CARE EDUCATION/TRAINING PROGRAM

## 2023-02-24 PROCEDURE — 3700000000 HC ANESTHESIA ATTENDED CARE: Performed by: STUDENT IN AN ORGANIZED HEALTH CARE EDUCATION/TRAINING PROGRAM

## 2023-02-24 PROCEDURE — 6360000002 HC RX W HCPCS: Performed by: STUDENT IN AN ORGANIZED HEALTH CARE EDUCATION/TRAINING PROGRAM

## 2023-02-24 PROCEDURE — C1713 ANCHOR/SCREW BN/BN,TIS/BN: HCPCS | Performed by: STUDENT IN AN ORGANIZED HEALTH CARE EDUCATION/TRAINING PROGRAM

## 2023-02-24 PROCEDURE — 3700000001 HC ADD 15 MINUTES (ANESTHESIA): Performed by: STUDENT IN AN ORGANIZED HEALTH CARE EDUCATION/TRAINING PROGRAM

## 2023-02-24 PROCEDURE — 3600000005 HC SURGERY LEVEL 5 BASE: Performed by: STUDENT IN AN ORGANIZED HEALTH CARE EDUCATION/TRAINING PROGRAM

## 2023-02-24 PROCEDURE — 7100000010 HC PHASE II RECOVERY - FIRST 15 MIN: Performed by: STUDENT IN AN ORGANIZED HEALTH CARE EDUCATION/TRAINING PROGRAM

## 2023-02-24 PROCEDURE — 7100000011 HC PHASE II RECOVERY - ADDTL 15 MIN: Performed by: STUDENT IN AN ORGANIZED HEALTH CARE EDUCATION/TRAINING PROGRAM

## 2023-02-24 PROCEDURE — 7100000001 HC PACU RECOVERY - ADDTL 15 MIN: Performed by: STUDENT IN AN ORGANIZED HEALTH CARE EDUCATION/TRAINING PROGRAM

## 2023-02-24 PROCEDURE — 73630 X-RAY EXAM OF FOOT: CPT

## 2023-02-24 PROCEDURE — 6360000002 HC RX W HCPCS: Performed by: NURSE ANESTHETIST, CERTIFIED REGISTERED

## 2023-02-24 DEVICE — CP LAG SCREW (T10)
Type: IMPLANTABLE DEVICE | Site: FOOT | Status: FUNCTIONAL
Brand: ANCHORAGE

## 2023-02-24 DEVICE — GRAFT BNE SUB W6XH24XL14MM FT ANK PRESHAPED ALLGRFT FOR COT: Type: IMPLANTABLE DEVICE | Site: FOOT | Status: FUNCTIONAL

## 2023-02-24 DEVICE — INJECTABLE KIT
Type: IMPLANTABLE DEVICE | Site: FOOT | Status: FUNCTIONAL
Brand: AUGMENT® INJECTABLE

## 2023-02-24 DEVICE — LOCKING SCREW
Type: IMPLANTABLE DEVICE | Site: FOOT | Status: FUNCTIONAL
Brand: VARIAX

## 2023-02-24 DEVICE — POLYAXIAL LOCKING PLATE -  LAPIDUS CROSS-PLATE, LEFT (T10)
Type: IMPLANTABLE DEVICE | Site: FOOT | Status: FUNCTIONAL
Brand: ANCHORAGE

## 2023-02-24 RX ORDER — MIDAZOLAM HYDROCHLORIDE 1 MG/ML
INJECTION INTRAMUSCULAR; INTRAVENOUS PRN
Status: DISCONTINUED | OUTPATIENT
Start: 2023-02-24 | End: 2023-02-24 | Stop reason: SDUPTHER

## 2023-02-24 RX ORDER — SODIUM CHLORIDE 0.9 % (FLUSH) 0.9 %
5-40 SYRINGE (ML) INJECTION PRN
Status: DISCONTINUED | OUTPATIENT
Start: 2023-02-24 | End: 2023-02-24 | Stop reason: HOSPADM

## 2023-02-24 RX ORDER — MEPERIDINE HYDROCHLORIDE 25 MG/ML
12.5 INJECTION INTRAMUSCULAR; INTRAVENOUS; SUBCUTANEOUS EVERY 5 MIN PRN
Status: DISCONTINUED | OUTPATIENT
Start: 2023-02-24 | End: 2023-02-24 | Stop reason: HOSPADM

## 2023-02-24 RX ORDER — DIPHENHYDRAMINE HYDROCHLORIDE 50 MG/ML
12.5 INJECTION INTRAMUSCULAR; INTRAVENOUS
Status: DISCONTINUED | OUTPATIENT
Start: 2023-02-24 | End: 2023-02-24 | Stop reason: HOSPADM

## 2023-02-24 RX ORDER — LIDOCAINE HYDROCHLORIDE 20 MG/ML
INJECTION, SOLUTION EPIDURAL; INFILTRATION; INTRACAUDAL; PERINEURAL PRN
Status: DISCONTINUED | OUTPATIENT
Start: 2023-02-24 | End: 2023-02-24 | Stop reason: SDUPTHER

## 2023-02-24 RX ORDER — LABETALOL HYDROCHLORIDE 5 MG/ML
5 INJECTION, SOLUTION INTRAVENOUS
Status: DISCONTINUED | OUTPATIENT
Start: 2023-02-24 | End: 2023-02-24 | Stop reason: HOSPADM

## 2023-02-24 RX ORDER — FENTANYL CITRATE 50 UG/ML
INJECTION, SOLUTION INTRAMUSCULAR; INTRAVENOUS PRN
Status: DISCONTINUED | OUTPATIENT
Start: 2023-02-24 | End: 2023-02-24 | Stop reason: SDUPTHER

## 2023-02-24 RX ORDER — FENTANYL CITRATE 50 UG/ML
25 INJECTION, SOLUTION INTRAMUSCULAR; INTRAVENOUS EVERY 5 MIN PRN
Status: DISCONTINUED | OUTPATIENT
Start: 2023-02-24 | End: 2023-02-24 | Stop reason: HOSPADM

## 2023-02-24 RX ORDER — BACITRACIN ZINC AND POLYMYXIN B SULFATE 500; 1000 [USP'U]/G; [USP'U]/G
OINTMENT TOPICAL
Status: COMPLETED | OUTPATIENT
Start: 2023-02-24 | End: 2023-02-24

## 2023-02-24 RX ORDER — SODIUM CHLORIDE 0.9 % (FLUSH) 0.9 %
5-40 SYRINGE (ML) INJECTION EVERY 12 HOURS SCHEDULED
Status: DISCONTINUED | OUTPATIENT
Start: 2023-02-24 | End: 2023-02-24 | Stop reason: HOSPADM

## 2023-02-24 RX ORDER — LIDOCAINE HYDROCHLORIDE 10 MG/ML
INJECTION, SOLUTION EPIDURAL; INFILTRATION; INTRACAUDAL; PERINEURAL
Status: COMPLETED | OUTPATIENT
Start: 2023-02-24 | End: 2023-02-24

## 2023-02-24 RX ORDER — SODIUM CHLORIDE 9 MG/ML
INJECTION, SOLUTION INTRAVENOUS PRN
Status: DISCONTINUED | OUTPATIENT
Start: 2023-02-24 | End: 2023-02-24 | Stop reason: HOSPADM

## 2023-02-24 RX ORDER — MIDAZOLAM HYDROCHLORIDE 1 MG/ML
INJECTION INTRAMUSCULAR; INTRAVENOUS
Status: DISCONTINUED
Start: 2023-02-24 | End: 2023-02-24 | Stop reason: HOSPADM

## 2023-02-24 RX ORDER — ONDANSETRON 2 MG/ML
4 INJECTION INTRAMUSCULAR; INTRAVENOUS
Status: DISCONTINUED | OUTPATIENT
Start: 2023-02-24 | End: 2023-02-24 | Stop reason: HOSPADM

## 2023-02-24 RX ORDER — GLYCOPYRROLATE 0.2 MG/ML
INJECTION INTRAMUSCULAR; INTRAVENOUS PRN
Status: DISCONTINUED | OUTPATIENT
Start: 2023-02-24 | End: 2023-02-24 | Stop reason: SDUPTHER

## 2023-02-24 RX ORDER — PROPOFOL 10 MG/ML
INJECTION, EMULSION INTRAVENOUS PRN
Status: DISCONTINUED | OUTPATIENT
Start: 2023-02-24 | End: 2023-02-24 | Stop reason: SDUPTHER

## 2023-02-24 RX ORDER — PHENYLEPHRINE HCL IN 0.9% NACL 1 MG/10 ML
SYRINGE (ML) INTRAVENOUS PRN
Status: DISCONTINUED | OUTPATIENT
Start: 2023-02-24 | End: 2023-02-24 | Stop reason: SDUPTHER

## 2023-02-24 RX ORDER — BUPIVACAINE HYDROCHLORIDE 5 MG/ML
INJECTION, SOLUTION EPIDURAL; INTRACAUDAL
Status: COMPLETED | OUTPATIENT
Start: 2023-02-24 | End: 2023-02-24

## 2023-02-24 RX ORDER — MAGNESIUM HYDROXIDE 1200 MG/15ML
LIQUID ORAL CONTINUOUS PRN
Status: COMPLETED | OUTPATIENT
Start: 2023-02-24 | End: 2023-02-24

## 2023-02-24 RX ADMIN — Medication 100 MCG: at 13:16

## 2023-02-24 RX ADMIN — Medication 100 MCG: at 13:39

## 2023-02-24 RX ADMIN — MIDAZOLAM 2 MG: 1 INJECTION INTRAMUSCULAR; INTRAVENOUS at 12:26

## 2023-02-24 RX ADMIN — PROPOFOL 100 MG: 10 INJECTION, EMULSION INTRAVENOUS at 12:34

## 2023-02-24 RX ADMIN — Medication 100 MCG: at 12:56

## 2023-02-24 RX ADMIN — Medication 100 MCG: at 12:47

## 2023-02-24 RX ADMIN — Medication 100 MCG: at 13:26

## 2023-02-24 RX ADMIN — CEFAZOLIN 2000 MG: 2 INJECTION, POWDER, FOR SOLUTION INTRAMUSCULAR; INTRAVENOUS at 12:28

## 2023-02-24 RX ADMIN — SODIUM CHLORIDE: 9 INJECTION, SOLUTION INTRAVENOUS at 11:55

## 2023-02-24 RX ADMIN — FENTANYL CITRATE 25 MCG: 50 INJECTION INTRAMUSCULAR; INTRAVENOUS at 13:16

## 2023-02-24 RX ADMIN — Medication 100 MCG: at 13:51

## 2023-02-24 RX ADMIN — GLYCOPYRROLATE 0.2 MG: 0.2 INJECTION, SOLUTION INTRAMUSCULAR; INTRAVENOUS at 12:26

## 2023-02-24 RX ADMIN — PROPOFOL 200 MCG/KG/MIN: 10 INJECTION, EMULSION INTRAVENOUS at 12:35

## 2023-02-24 RX ADMIN — FENTANYL CITRATE 25 MCG: 50 INJECTION INTRAMUSCULAR; INTRAVENOUS at 12:37

## 2023-02-24 RX ADMIN — Medication 100 MCG: at 13:05

## 2023-02-24 RX ADMIN — LIDOCAINE HYDROCHLORIDE 80 MG: 20 INJECTION, SOLUTION EPIDURAL; INFILTRATION; INTRACAUDAL; PERINEURAL at 12:34

## 2023-02-24 ASSESSMENT — PAIN SCALES - GENERAL
PAINLEVEL_OUTOF10: 0
PAINLEVEL_OUTOF10: 0

## 2023-02-24 ASSESSMENT — ENCOUNTER SYMPTOMS: SHORTNESS OF BREATH: 0

## 2023-02-24 ASSESSMENT — LIFESTYLE VARIABLES: SMOKING_STATUS: 0

## 2023-02-24 NOTE — ANESTHESIA POSTPROCEDURE EVALUATION
Department of Anesthesiology  Postprocedure Note    Patient: Christy James  MRN: [id number]  YOB: 1970  Date of evaluation: 2/24/2023      Procedure Summary     Date: 02/24/23 Room / Location: [address]    Anesthesia Start: 5076 Anesthesia Stop: 4977    Procedure: LAPIDUS BUNIONECTOMY LEFT FOOT (Left: Foot) Diagnosis:       Hallux valgus of left foot      Pain in left foot      (HALLUX VALGUS LEFT FOOT, PAIN IN LEFT FOOT)    Surgeons: Neris Cedeño DPM Responsible Provider: Nicola Britton MD    Anesthesia Type: MAC ASA Status: 3          Anesthesia Type: No value filed.     Lelia Phase I: Lelia Score: 9    Lelia Phase II: Lelia Score: 10      Anesthesia Post Evaluation    Patient location during evaluation: PACU  Level of consciousness: awake and alert  Airway patency: patent  Nausea & Vomiting: no nausea and no vomiting  Complications: no  Cardiovascular status: blood pressure returned to baseline  Respiratory status: acceptable  Hydration status: euvolemic  Comments: Postoperative Anesthesia Note    Name:    Christy James  MRN:      [id number]    Patient Vitals in the past 12 hrs:  02/24/23 1530, BP:130/78, Temp:97.8 °F (36.6 °C), Temp src:Temporal, Pulse:81, Resp:18, SpO2:100 %  02/24/23 1525, Temp:97.5 °F (36.4 °C), Resp:23  02/24/23 1520, BP:(!) 117/91, Pulse:(!) 101, Resp:17, SpO2:100 %  02/24/23 1519, BP:(!) 118/95, Pulse:96, Resp:24, SpO2:100 %  02/24/23 1515, BP:(!) 131/100, Pulse:97, Resp:16, SpO2:100 %  02/24/23 1500, BP:111/73, Pulse:91, Resp:19, SpO2:100 %  02/24/23 1445, BP:111/75, Pulse:98, Resp:12, SpO2:100 %  02/24/23 1442, BP:106/70, Pulse:(!) 104, Resp:16, SpO2:100 %  02/24/23 1441, Pulse:(!) 106, Resp:19  02/24/23 1436, BP:121/84, Pulse:90, Resp:17, SpO2:100 %  02/24/23 1428, BP:(!) 111/90, Temp:97.5 °F (36.4 °C), Temp src:Tympanic, Pulse:93, Resp:15, SpO2:100 %  02/24/23 1115, BP:118/76, Temp:97.7 °F (36.5 °C), Temp src:Temporal, Pulse:77, Resp:17, SpO2:99 %  02/24/23 1100, Height:5' 8\" (1.727 m), Weight:161 lb 4.3 oz (73.1 kg)     LABS:    CBC  Lab Results       Component                Value               Date/Time                  WBC                      5.9                 11/06/2022 12:43 AM        HGB                      11.3 (L)            11/06/2022 12:43 AM        HCT                      35.5 (L)            11/06/2022 12:43 AM        PLT                      202                 11/06/2022 12:43 AM   RENAL  Lab Results       Component                Value               Date/Time                  NA                       141                 11/06/2022 12:43 AM        K                        3.6                 11/06/2022 12:43 AM        K                        3.9                 04/11/2022 11:10 PM        CL                       106                 11/06/2022 12:43 AM        CO2                      23                  11/06/2022 12:43 AM        BUN                      11                  11/06/2022 12:43 AM        CREATININE               1.1                 11/06/2022 12:43 AM        GLUCOSE                  94                  11/06/2022 12:43 AM   COAGS  Lab Results       Component                Value               Date/Time                  PROTIME                  12.2                04/11/2022 11:10 PM        INR                      1.08                04/11/2022 11:10 PM     Intake & Output:  @00ZGCA@    Nausea & Vomiting:  No    Level of Consciousness:  Awake    Pain Assessment:  Adequate analgesia    Anesthesia Complications:  No apparent anesthetic complications    SUMMARY      Vital signs stable  OK to discharge from Stage I post anesthesia care.   Care transferred from Anesthesiology department on discharge from perioperative area

## 2023-02-24 NOTE — PROGRESS NOTES
Pt awake, states \"I want to go home\". Occasional twitching noted but pt talking while twitching. To phase 2 care.

## 2023-02-24 NOTE — ANESTHESIA PRE PROCEDURE
Department of Anesthesiology  Preprocedure Note       Name:  Shadi Cassidy   Age:  50 y.o.  :  1970                                          MRN:  [id number]         Date:  2023      Surgeon: Moises Valentine):  Rufino Figueroa DPM    Procedure: Procedure(s):  LAPIDUS BUNIONECTOMY LEFT FOOT    Medications prior to admission:   Prior to Admission medications    Medication Sig Start Date End Date Taking? Authorizing Provider   levETIRAcetam (KEPPRA) 1000 MG tablet Take 2,000 mg by mouth 2 times daily  Patient not taking: Reported on 2023    Historical Provider, MD   cloBAZam (ONFI) 10 MG TABS tablet Take 10 mg by mouth at bedtime.     Historical Provider, MD   DULoxetine (CYMBALTA) 30 MG extended release capsule Take 30 mg by mouth daily    Historical Provider, MD   cyanocobalamin 1000 MCG tablet Take 1,000 mcg by mouth daily    Historical Provider, MD   vitamin D (CHOLECALCIFEROL) 25 MCG (1000 UT) TABS tablet Take 2,000 Units by mouth daily    Historical Provider, MD   lamoTRIgine (LAMICTAL) 150 MG tablet Take 300 mg by mouth 2 times daily    Historical Provider, MD   magnesium oxide (MAG-OX) 400 MG tablet Take 400 mg by mouth 2 times daily    Historical Provider, MD   albuterol sulfate  (90 BASE) MCG/ACT inhaler Inhale 2 puffs into the lungs every 6 hours as needed for Wheezing    Historical Provider, MD   Fe Fum-FA-B Mfx-Y-Uf-Mg-Mn-Cu (CENTRATEX PO) Take 1 capsule by mouth 2 times daily    Historical Provider, MD       Current medications:    Current Facility-Administered Medications   Medication Dose Route Frequency Provider Last Rate Last Admin    sodium chloride flush 0.9 % injection 5-40 mL  5-40 mL IntraVENous 2 times per day Eleazar Benoit MD        sodium chloride flush 0.9 % injection 5-40 mL  5-40 mL IntraVENous PRN Eleazar Benoit MD        0.9 % sodium chloride infusion   IntraVENous PRN Eleazar Benoit MD        ceFAZolin (ANCEF) 2,000 mg in sodium chloride 0.9 % 50 mL IVPB (mini-bag) 2,000 mg IntraVENous Once Arlene Davis DPM           Allergies:  No Known Allergies    Problem List:    Patient Active Problem List   Diagnosis Code    Complex partial seizure evolving to generalized seizure ([facility] [address] G40.209    Headache R51.9    Intractable chronic migraine without aura G43.719    Anemia D64.9    Depression F32. A    Memory loss R41.3    Left arm numbness R20.0    Seizures ([facility] [address] R56.9       Past Medical History:        Diagnosis Date    Anemia     Depression 9/16/2014    Headache(784.0)     Seizures [facility])        Past Surgical History:        Procedure Laterality Date    BRAIN SURGERY      2019    HYSTERECTOMY (CERVIX STATUS UNKNOWN)      2021 per pt.  KNEE SURGERY Left     TUBAL LIGATION         Social History:    Social History     Tobacco Use    Smoking status: Never    Smokeless tobacco: Never   Substance Use Topics    Alcohol use: No     Alcohol/week: 0.0 standard drinks                                Counseling given: Not Answered      Vital Signs (Current):   Vitals:    02/21/23 0810 02/24/23 1100 02/24/23 1115   BP:   118/76   Pulse:   77   Resp:   17   Temp:   97.7 °F (36.5 °C)   TempSrc:   Temporal   SpO2:   99%   Weight: 158 lb (71.7 kg) 161 lb 4.3 oz (73.1 kg)    Height: 5' 8.5\" (1.74 m) 5' 8\" (1.727 m)                                               BP Readings from Last 3 Encounters:   02/24/23 118/76   11/06/22 105/72   04/11/22 129/86       NPO Status: Time of last liquid consumption: 1000 (sips with meds)                        Time of last solid consumption: 2000                        Date of last liquid consumption: 02/24/23                        Date of last solid food consumption: 02/23/23    BMI:   Wt Readings from Last 3 Encounters:   02/24/23 161 lb 4.3 oz (73.1 kg)   11/06/22 157 lb 3 oz (71.3 kg)   04/11/22 157 lb 6.5 oz (71.4 kg)     Body mass index is 24.52 kg/m².     CBC:   Lab Results   Component Value Date/Time    WBC 5.9 11/06/2022 12:43 AM    RBC 5.01 11/06/2022 12:43 AM    HGB 11.3 11/06/2022 12:43 AM    HCT 35.5 11/06/2022 12:43 AM    MCV 71.0 11/06/2022 12:43 AM    RDW 17.0 11/06/2022 12:43 AM     11/06/2022 12:43 AM       CMP:   Lab Results   Component Value Date/Time     11/06/2022 12:43 AM    K 3.6 11/06/2022 12:43 AM    K 3.9 04/11/2022 11:10 PM     11/06/2022 12:43 AM    CO2 23 11/06/2022 12:43 AM    BUN 11 11/06/2022 12:43 AM    CREATININE 1.1 11/06/2022 12:43 AM    GFRAA >60 04/11/2022 11:10 PM    AGRATIO 1.6 11/06/2022 12:43 AM    LABGLOM >60 11/06/2022 12:43 AM    GLUCOSE 94 11/06/2022 12:43 AM    PROT 6.7 11/06/2022 12:43 AM    CALCIUM 8.8 11/06/2022 12:43 AM    BILITOT 0.3 11/06/2022 12:43 AM    ALKPHOS 56 11/06/2022 12:43 AM    AST 9 11/06/2022 12:43 AM    ALT <5 11/06/2022 12:43 AM       POC Tests: No results for input(s): POCGLU, POCNA, POCK, POCCL, POCBUN, POCHEMO, POCHCT in the last 72 hours.     Coags:   Lab Results   Component Value Date/Time    PROTIME 12.2 04/11/2022 11:10 PM    INR 1.08 04/11/2022 11:10 PM       HCG (If Applicable):   Lab Results   Component Value Date    PREGTESTUR Negative 04/11/2022        ABGs: No results found for: PHART, PO2ART, LGP9MRX, WIG7SJW, BEART, W4PAVNZB     Type & Screen (If Applicable):  No results found for: LABABO, LABRH    Drug/Infectious Status (If Applicable):  No results found for: HIV, HEPCAB    COVID-19 Screening (If Applicable): No results found for: COVID19        Anesthesia Evaluation  Patient summary reviewed no history of anesthetic complications:   Airway: Mallampati: II  TM distance: >3 FB   Neck ROM: full  Mouth opening: > = 3 FB   Dental: normal exam         Pulmonary:Negative Pulmonary ROS and normal exam  breath sounds clear to auscultation      (-) shortness of breath and not a current smoker                           Cardiovascular:Negative CV ROS  Exercise tolerance: good (>4 METS),           Rhythm: regular  Rate: normal                    Neuro/Psych:   (+) seizures:, headaches:, depression/anxiety              ROS comment: Seizures occur mostly at night - couple times per week. Pt on three meds (dxn at 1 months old). Pt not aware when she has a seizure, but family will witness. S/P crani for seizures RX. GI/Hepatic/Renal: Neg GI/Hepatic/Renal ROS            Endo/Other:    (+) blood dyscrasia: anemia:., .                 Abdominal:             Vascular: negative vascular ROS. Other Findings:           Anesthesia Plan      MAC     ASA 3       Induction: intravenous. Anesthetic plan and risks discussed with patient. Plan discussed with CRNA.     Attending anesthesiologist reviewed and agrees with Radha Mcrae MD   2/24/2023

## 2023-02-24 NOTE — PROGRESS NOTES
Pt awake, asking to \"pee\". Pt placed on bedpan. No seizure like activity while on bedpan. Dr. Shaina Holt updated on pt status. OK with Dr. Shaina Holt to transfer to phase 2 care.

## 2023-02-24 NOTE — DISCHARGE INSTRUCTIONS
Post-Op Discharge Instructions    Fluids and Diet  1. Begin with clear liquids, bouillon, dry toast, soda crackers  2. If NOT nauseated, you may resume a regular diet when you desire    Medications  1. Take prescription medications only as directed  2. If pain is not severe, you may take the non-prescription medication that you normally take. 3. If any side effects or adverse reactions occur, discontinue the medication and contact your doctor. 4. Review the patient drug information leaflet, when provided, before you begin taking the medication    Ambulation and Activity  1. You are advised to go directly home from the hospital  2. Use the prescribed walking aids and Cam boot with knee scooter or crutches. 3. Non-weightbearing to Left  foot. 4. Do not lift or move heavy objects  5. Do not Drive    Post Anesthesia Instructions  1. Do not drive or operate machinery  2. Do not consume alcohol, tranquilizers, sleeping medications, or a non-prescription pain medication  3. Do not make important decisions  4. You should have someone home with you tonight    Care of the Operative Site  1. Keep cast or bandage clean, dry, and intact  2. Do not shower  3. Do not remove bandage  4. Elevate Operative extremity as much as possible to reduce swelling and discomfort for the first 72 hours  5. Apply ice bag wrapped in thick towel over bandage 20 minutes of every hour for the first 72 hours while awake  6. Do not attempt to put anything between the cast or dressing and skin, some itching is normal    Special Instructions: Call doctor immediately if you develop any of the followin. Fever over 100 degrees by mouth - take your temperature daily  2. Pain not relieved by medication ordered  3. Swelling, increased redness, warmth, or hardness around operative area  4. Numb, tingling or cold toes  5. Toe(s) become white or bluish  6. Bandage becomes wet, soiled, or blood soaked (a small amount of bleeding may be normal)  7. Increasing or progressive drainage from surgical area    Follow up - Call Dr. Zahraa Conteh office for a follow-up appointment

## 2023-02-24 NOTE — OP NOTE
Operative Note      Patient: Zuri Wise  YOB: 1970  MRN: [id number]    Date of Procedure: 2/24/2023    Pre-Op Diagnosis: HALLUX VALGUS LEFT FOOT, PAIN IN LEFT FOOT    Post-Op Diagnosis: Same       Procedure(s):  LAPIDUS BUNIONECTOMY LEFT FOOT    Surgeon(s):  Lyndsey Christianson DPM    Assistant:   Surgical Assistant: Gonzalez Centeno; Francisco Javier Thompson RN; Reji Rodriguez    Anesthesia: Monitor Anesthesia Care    Estimated Blood Loss (mL): less than 50     Complications: None    Specimens:   * No specimens in log *    Implants:  Implant Name Type Inv. Item Serial No.  Lot No. LRB No. Used Action   GRAFT BNE SUB 3CC RHPDGF BB B TRICALCIUM PHSPTE RADPQ AUG - [id number]  GRAFT BNE SUB 3CC RHPDGF BB B TRICALCIUM PHSPTE RADPQ AUG  ALANIZ D [facility] [employer] [facility] [id number] Left 1 Implanted   GRAFT BNE SUB [id number] FT ANK PRESHAPED ALLGRFT FOR COT - [id number]  GRAFT BNE SUB [id number] FT ANK PRESHAPED ALLGRFT FOR COT [id number] [employer] [facility]  Left 1 Implanted   PLATE BNE L LAPIDUS POLYAX HARVEY T10 CRSS ANCHORAGE - [id number]  PLATE BNE L LAPIDUS POLYAX HARVEY T10 CRSS ANCHORAGE  [employer] [facility]  Left 1 Implanted   SCREW BNE L16MM DIA3.5MM ERIC TI HARVEY FULL THRD T10 DRV FOR - [id number]  SCREW BNE L16MM DIA3.5MM ERIC TI HARVEY FULL THRD T10 DRV FOR  [employer] [facility]  Left 3 Implanted   SCREW BNE L14MM DIA3.5MM CANC TI HARVEY FULL THRD T10 DRV FOR - [id number]  SCREW BNE L14MM DIA3.5MM CANC TI HARVEY FULL THRD T10 DRV FOR  [employer] [facility]  Left 1 Implanted   SCREW BNE L40MM [id number] LAG T10 DRV CRSS PLT TECHNOLOGY - [id number]  SCREW BNE L40MM [id number] LAG T10 DRV CRSS PLT TECHNOLOGY  [employer]  Left 1 Implanted         Drains: * No LDAs found *    Findings: Osteochondral damage to the first metatarsophalangeal joint     Detailed Description of Procedure:   Patient was brought to the operating suite and placed on the operating table in the supine position.   Once monitored anesthesia care was administered a Peters block was applied to the left foot utilizing 5 cc of lidocaine plain 1% and 5 cc of Marcaine plain half percent. Next ample padding was placed around the left lower extremity at the level of the calf followed by a tourniquet. The left lower extremity was prepped and draped in normal aseptic fashion. A timeout was held confirming the patient, procedure, laterality, allergies, risks and was agreed upon by all. At this time an Esmarch was utilized to exsanguinate the left lower extremity and the tourniquet was inflated to 250 mmHg and the following procedure was performed. Attention was directed to the left foot  utilizing a #15 blade a longitudinal incision was made from just proximal to the first tarsometatarsal joint to just distal to the metatarsophalangeal joint. This incision was deepened through subcutaneous tissue with sharp and blunt means with care taken to retract all vital neurovascular structures as well as all venous tributaries that were encountered were ligated with electrocautery. This time the 15 blade was then used again to incise the joint capsule of the first TMT as well as the first metatarsophalangeal joint and the periosteum within that space. Care was taken to reflect the capsular material and periosteum medially and laterally. Next utilizing a curved hemostat blunt dissection was utilized to free up the first intermetatarsal space between the first and second metatarsal heads with visualization of the fibular sesamoid. An incision was made into the lateral joint capsule of the first metatarsophalangeal joint utilizing a #15 blade and a lateral release was performed.   Next attention was redirected to the first tarsometatarsal joint where the joint was freed up utilizing a osteotome, next a sagittal saw was utilized to resect the base of the proximal first met as well as the face of the medial cuneiform taking a plantarflexed and lateral wedge. Next the face of the cuneiform and the base of the first met were subchondrally drilled with good bone bleeding noted. At this time it was aligned and a temporary K wire fixation was put through followed by intraoperative fluoroscopy and it was determined at this time that the first metatarsal was too short. After soaking a 6 mm cortical and saturating both sides with liquid bone graft augment, the graft piece was placed in the first tarsometatarsal joint space, temporary fixation with a K wire was thrown and intraoperative fluoroscopy utilized to assess its position, the wire was then removed and the graft trimmed shorter and placed more medially to abduct the first ray. Again intraoperative fluoroscopy was utilized once temporary fixation was placed and found to be an adequate reduction of the first IM. Next the template was placed over the first TMT and affixed with a K wire perpendicular to the first met, intraoperative fluoroscopy was utilized to verify the position of the template for the location of the palpable. Then the template was removed and a bur was used over the K wire make a hole in the dorsal aspect of the first metatarsal.  At this time the K wire was removed and the pocket plate was placed and temporarily affixed with 2 BB tacks. Of note while placing the distal BB tack the bone was so hard that the BB tack bent and had to be replaced. This time the 2 distal holes were drilled and filled with locking screws. Next using guide wire the K wire was thrown into the compression hole the position of the K wire and length was verified with both DP and lateral x-ray. At this time the compression hole was drilled through and through. And a lag screw was placed just before it was completely screwed in temporary fixation and BB tacks were removed and the compression screw was screwed all the way in with excellent compression of the TMT joint.   At this time the 2 proximal holes were drilled and filled with locking screws. Next utilizing a sagittal saw the remaining medial eminence was resected and passed from the table. At this time the joint capsule and periosteum was closed utilizing a running 3-0 Vicryl. Subcutaneous tissue was closed with 4-0 Vicryl. And the dermal layer was reapproximated with 5-0 Vicryl in a running subcuticular fashion. The tourniquet was then let down for a total of 93 minutes. The left foot was then dressed with Steri-Strips, antibiotic ointment, gauze, Kerlix, Coban for gentle compression. Patient has been provided with a cam boot for protection she will be nonweightbearing. Patient has a knee scooter and is being dispensed a pair of crutches here at the hospital.  Patient has already been provided her postoperative pain medication will follow-up in my office on Monday.     Juany Wilkerson DPM,   [address] [facility] [address] [place] [address]  [address]  [phone] office   [phone] fax   [phone] cell  2/24/2023, 3:02 PM      Electronically signed by Juany Wilkerson DPM on 2/24/2023 at 2:38 PM

## 2023-02-24 NOTE — H&P
No change in patients H&P in past 30days.      Osmin Choi DPM,   [address] [facility] [address]  [place], [address] [place] [address]  [phone] office   [phone] fax   [phone] cell  2/24/2023, 12:26 PM

## 2023-06-05 ENCOUNTER — APPOINTMENT (OUTPATIENT)
Dept: CT IMAGING | Age: 53
End: 2023-06-05
Payer: MEDICAID

## 2023-06-05 ENCOUNTER — HOSPITAL ENCOUNTER (EMERGENCY)
Age: 53
Discharge: HOME | End: 2023-06-05
Attending: EMERGENCY MEDICINE
Payer: MEDICAID

## 2023-06-05 ENCOUNTER — APPOINTMENT (OUTPATIENT)
Dept: GENERAL RADIOLOGY | Age: 53
End: 2023-06-05
Payer: MEDICAID

## 2023-06-05 VITALS
SYSTOLIC BLOOD PRESSURE: 122 MMHG | OXYGEN SATURATION: 98 % | TEMPERATURE: 98 F | BODY MASS INDEX: 25.76 KG/M2 | DIASTOLIC BLOOD PRESSURE: 74 MMHG | HEIGHT: 68 IN | RESPIRATION RATE: 23 BRPM | WEIGHT: 170 LBS | HEART RATE: 74 BPM

## 2023-06-05 DIAGNOSIS — G40.919 BREAKTHROUGH SEIZURE (HCC): Primary | ICD-10-CM

## 2023-06-05 LAB
ALBUMIN SERPL-MCNC: 4.5 G/DL (ref 3.4–5)
ALBUMIN/GLOB SERPL: 1.3 {RATIO} (ref 1.1–2.2)
ALP SERPL-CCNC: 73 U/L (ref 40–129)
ALT SERPL-CCNC: 13 U/L (ref 10–40)
AMPHETAMINES UR QL SCN>1000 NG/ML: ABNORMAL
ANION GAP SERPL CALCULATED.3IONS-SCNC: 15 MMOL/L (ref 3–16)
AST SERPL-CCNC: 23 U/L (ref 15–37)
BARBITURATES UR QL SCN>200 NG/ML: ABNORMAL
BASOPHILS # BLD: 0 K/UL (ref 0–0.2)
BASOPHILS NFR BLD: 0.8 %
BENZODIAZ UR QL SCN>200 NG/ML: ABNORMAL
BILIRUB SERPL-MCNC: 0.4 MG/DL (ref 0–1)
BILIRUB UR QL STRIP.AUTO: NEGATIVE
BUN SERPL-MCNC: 11 MG/DL (ref 7–20)
CALCIUM SERPL-MCNC: 10.1 MG/DL (ref 8.3–10.6)
CANNABINOIDS UR QL SCN>50 NG/ML: ABNORMAL
CHLORIDE SERPL-SCNC: 101 MMOL/L (ref 99–110)
CLARITY UR: CLEAR
CO2 SERPL-SCNC: 25 MMOL/L (ref 21–32)
COCAINE UR QL SCN: ABNORMAL
COLOR UR: YELLOW
CREAT SERPL-MCNC: 1 MG/DL (ref 0.6–1.1)
DEPRECATED RDW RBC AUTO: 15.2 % (ref 12.4–15.4)
DRUG SCREEN COMMENT UR-IMP: ABNORMAL
EOSINOPHIL # BLD: 0.2 K/UL (ref 0–0.6)
EOSINOPHIL NFR BLD: 2.6 %
ETHANOLAMINE SERPL-MCNC: NORMAL MG/DL (ref 0–0.08)
FENTANYL SCREEN, URINE: ABNORMAL
GFR SERPLBLD CREATININE-BSD FMLA CKD-EPI: >60 ML/MIN/{1.73_M2}
GLUCOSE SERPL-MCNC: 70 MG/DL (ref 70–99)
GLUCOSE UR STRIP.AUTO-MCNC: NEGATIVE MG/DL
HCT VFR BLD AUTO: 38.8 % (ref 36–48)
HGB BLD-MCNC: 12.4 G/DL (ref 12–16)
HGB UR QL STRIP.AUTO: NEGATIVE
KETONES UR STRIP.AUTO-MCNC: NEGATIVE MG/DL
LEUKOCYTE ESTERASE UR QL STRIP.AUTO: NEGATIVE
LEVETIRACETAM SERPL-MCNC: 13.2 UG/ML (ref 6–46)
LYMPHOCYTES # BLD: 2.1 K/UL (ref 1–5.1)
LYMPHOCYTES NFR BLD: 36.3 %
MCH RBC QN AUTO: 24.6 PG (ref 26–34)
MCHC RBC AUTO-ENTMCNC: 32 G/DL (ref 31–36)
MCV RBC AUTO: 76.9 FL (ref 80–100)
MEDICATION DOSE-MCNC: NORMAL
METHADONE UR QL SCN>300 NG/ML: ABNORMAL
MONOCYTES # BLD: 0.6 K/UL (ref 0–1.3)
MONOCYTES NFR BLD: 10.9 %
NEUTROPHILS # BLD: 2.9 K/UL (ref 1.7–7.7)
NEUTROPHILS NFR BLD: 49.4 %
NITRITE UR QL STRIP.AUTO: NEGATIVE
OPIATES UR QL SCN>300 NG/ML: ABNORMAL
OXYCODONE UR QL SCN: ABNORMAL
PCP UR QL SCN>25 NG/ML: POSITIVE
PH UR STRIP.AUTO: 7 [PH] (ref 5–8)
PH UR STRIP: 7 [PH]
PLATELET # BLD AUTO: 225 K/UL (ref 135–450)
PMV BLD AUTO: 8.6 FL (ref 5–10.5)
POTASSIUM SERPL-SCNC: 4.2 MMOL/L (ref 3.5–5.1)
PROT SERPL-MCNC: 7.9 G/DL (ref 6.4–8.2)
PROT UR STRIP.AUTO-MCNC: NEGATIVE MG/DL
RBC # BLD AUTO: 5.04 M/UL (ref 4–5.2)
SODIUM SERPL-SCNC: 141 MMOL/L (ref 136–145)
SP GR UR STRIP.AUTO: 1.01 (ref 1–1.03)
UA COMPLETE W REFLEX CULTURE PNL UR: NORMAL
UA DIPSTICK W REFLEX MICRO PNL UR: NORMAL
URN SPEC COLLECT METH UR: NORMAL
UROBILINOGEN UR STRIP-ACNC: 0.2 E.U./DL
WBC # BLD AUTO: 5.9 K/UL (ref 4–11)

## 2023-06-05 PROCEDURE — 72125 CT NECK SPINE W/O DYE: CPT

## 2023-06-05 PROCEDURE — 6370000000 HC RX 637 (ALT 250 FOR IP): Performed by: NURSE PRACTITIONER

## 2023-06-05 PROCEDURE — 71045 X-RAY EXAM CHEST 1 VIEW: CPT

## 2023-06-05 PROCEDURE — 80053 COMPREHEN METABOLIC PANEL: CPT

## 2023-06-05 PROCEDURE — 70450 CT HEAD/BRAIN W/O DYE: CPT

## 2023-06-05 PROCEDURE — 82077 ASSAY SPEC XCP UR&BREATH IA: CPT

## 2023-06-05 PROCEDURE — 2580000003 HC RX 258: Performed by: NURSE PRACTITIONER

## 2023-06-05 PROCEDURE — 80177 DRUG SCRN QUAN LEVETIRACETAM: CPT

## 2023-06-05 PROCEDURE — 99284 EMERGENCY DEPT VISIT MOD MDM: CPT

## 2023-06-05 PROCEDURE — 80307 DRUG TEST PRSMV CHEM ANLYZR: CPT

## 2023-06-05 PROCEDURE — 81003 URINALYSIS AUTO W/O SCOPE: CPT

## 2023-06-05 PROCEDURE — 80175 DRUG SCREEN QUAN LAMOTRIGINE: CPT

## 2023-06-05 PROCEDURE — 85025 COMPLETE CBC W/AUTO DIFF WBC: CPT

## 2023-06-05 RX ORDER — LORAZEPAM 1 MG/1
1 TABLET ORAL ONCE
Status: COMPLETED | OUTPATIENT
Start: 2023-06-05 | End: 2023-06-05

## 2023-06-05 RX ORDER — 0.9 % SODIUM CHLORIDE 0.9 %
1000 INTRAVENOUS SOLUTION INTRAVENOUS ONCE
Status: COMPLETED | OUTPATIENT
Start: 2023-06-05 | End: 2023-06-05

## 2023-06-05 RX ADMIN — LORAZEPAM 1 MG: 1 TABLET ORAL at 19:14

## 2023-06-05 RX ADMIN — SODIUM CHLORIDE 1000 ML: 9 INJECTION, SOLUTION INTRAVENOUS at 21:21

## 2023-06-05 ASSESSMENT — ENCOUNTER SYMPTOMS
DIARRHEA: 0
COUGH: 0
VOMITING: 0
EYE PAIN: 0
SHORTNESS OF BREATH: 0
SORE THROAT: 0
NAUSEA: 0
ABDOMINAL PAIN: 0
BACK PAIN: 0
PHOTOPHOBIA: 0

## 2023-06-05 ASSESSMENT — PAIN SCALES - GENERAL
PAINLEVEL_OUTOF10: 0

## 2023-06-05 ASSESSMENT — LIFESTYLE VARIABLES
HOW MANY STANDARD DRINKS CONTAINING ALCOHOL DO YOU HAVE ON A TYPICAL DAY: 1 OR 2
HOW OFTEN DO YOU HAVE A DRINK CONTAINING ALCOHOL: MONTHLY OR LESS

## 2023-06-05 NOTE — ED TRIAGE NOTES
Pt arrives via ems for witnessed seizure by family. Pt states she takes her seizure medication but is unsure what the name of them are. Arrives alert and oriented. States she has pain in her head and neck. Pt was also evaluated on Friday for the same complaint.  Md at bedside

## 2023-06-06 NOTE — ED NOTES
Timothy Seo RN attempted IV access x 2. Charge nurse Slava Howard RN notified of need for US IV.       Kaylyn Francis RN  06/05/23 2010

## 2023-06-06 NOTE — ED PROVIDER NOTES
I independently performed a history and physical on Choco Monroe. All diagnostic, treatment, and disposition decisions were made by myself in conjunction with the advanced practice provider. For further details of [facility] emergency department encounter, please see JOANNA Fonseca's documentation. Chief Complaint   Patient presents with    Seizures     Pt arrives via ems for witnessed seizures by family; pt has hx of seizures; evaluated Friday for the same thing; arrives alert and oriented      Patient reports she had a seizure today but cannot tell me any more details. She states she was at her family member's house when it happened. She denies any pain complaints other than some head and neck pain. She states she is compliant with her medicines although she does not know the names of them. She states her pharmacy gives her packages that have the day and the date and she takes them according to that schedule and never misses a dose. Of note, the med list in our system does not match the med list from her neurologist at [facility].  That med list indicates the patient is on Briviact and Lamictal but not on Onfi or Keppra. Patient denies any other complaints. On my exam heart regular rhythm and lungs clear to station bilaterally and patient is alert and oriented x3 and neurologically intact.     Labs Reviewed   CBC WITH AUTO DIFFERENTIAL - Abnormal; Notable for the following components:       Result Value    MCV 76.9 (*)     MCH 24.6 (*)     All other components within normal limits   URINE DRUG SCREEN - Abnormal; Notable for the following components:    PCP Screen, Urine POSITIVE (*)     All other components within normal limits   COMPREHENSIVE METABOLIC PANEL W/ REFLEX TO MG FOR LOW K   URINALYSIS WITH REFLEX TO CULTURE   ETHANOL   LEVETIRACETAM LEVEL   LAMOTRIGINE LEVEL   POCT GLUCOSE     CT Head W/O Contrast    (Results Pending)   CT C-Spine W/O Contrast    (Results
images are visualized and preliminarily interpreted by the ED Provider with the below findings:    I do not note any areas of focal consolidation consistent with pneumonia. See full radiology read as below. Interpretation per the Radiologist below, if available at the time of this note:    CT Head W/O Contrast   Final Result   Head CT: No acute intracranial abnormality detected. Unchanged multifocal encephalomalacia. Cervical spine CT: No acute fracture or traumatic malalignment. CT C-Spine W/O Contrast   Final Result   Head CT: No acute intracranial abnormality detected. Unchanged multifocal encephalomalacia. Cervical spine CT: No acute fracture or traumatic malalignment. XR CHEST PORTABLE   Final Result   Negative chest radiograph. No results found. No results found. PROCEDURES   Unless otherwise noted below, none     Procedures    CRITICAL CARE TIME (.cctime)   CRITICAL CARE NOTE:    Wellington Mayfield CNP am the primary clinician of record. There was a high probability of clinically significant life-threatening deterioration of the patient's condition requiring my urgent intervention. Total critical care time was at least 25 minutes. Of nonconcurrent critical care time. This includes vital sign monitoring, pulse oximetry monitoring, telemetry monitoring, clinical response to the IV medications, reviewing the nursing notes, consultation time, dictation/documentation time, and interpretation of the labwork. This excludes any separately billable procedures performed. The critical care time above also includes time spent obtaining history from EMS, care everywhere and family, as the patient was unable to provide the history AND the history obtained was directly relevant to the care of the patient. PAST MEDICAL HISTORY      has a past medical history of Anemia, Depression (9/16/2014), Headache(784.0), and Seizures ([facility] [address]      Chronic Conditions

## 2023-06-07 LAB — LAMOTRIGINE SERPL-MCNC: 7 UG/ML (ref 3–15)

## 2023-06-20 ENCOUNTER — HOSPITAL ENCOUNTER (EMERGENCY)
Age: 53
Discharge: HOME | DRG: 053 | End: 2023-06-20
Attending: EMERGENCY MEDICINE
Payer: MEDICAID

## 2023-06-20 VITALS
OXYGEN SATURATION: 99 % | WEIGHT: 182.32 LBS | BODY MASS INDEX: 27 KG/M2 | SYSTOLIC BLOOD PRESSURE: 102 MMHG | RESPIRATION RATE: 22 BRPM | HEART RATE: 80 BPM | HEIGHT: 69 IN | TEMPERATURE: 97.3 F | DIASTOLIC BLOOD PRESSURE: 74 MMHG

## 2023-06-20 DIAGNOSIS — G40.919 BREAKTHROUGH SEIZURE (HCC): Primary | ICD-10-CM

## 2023-06-20 LAB
ANION GAP SERPL CALCULATED.3IONS-SCNC: 11 MMOL/L (ref 3–16)
BUN SERPL-MCNC: 12 MG/DL (ref 7–20)
CALCIUM SERPL-MCNC: 9.1 MG/DL (ref 8.3–10.6)
CHLORIDE SERPL-SCNC: 97 MMOL/L (ref 99–110)
CO2 SERPL-SCNC: 25 MMOL/L (ref 21–32)
CREAT SERPL-MCNC: 1.1 MG/DL (ref 0.6–1.1)
GFR SERPLBLD CREATININE-BSD FMLA CKD-EPI: >60 ML/MIN/{1.73_M2}
GLUCOSE BLD-MCNC: 89 MG/DL (ref 70–99)
GLUCOSE SERPL-MCNC: 90 MG/DL (ref 70–99)
LACTATE BLDV-SCNC: 1.2 MMOL/L (ref 0.4–2)
LEVETIRACETAM SERPL-MCNC: 55.6 UG/ML (ref 6–46)
MEDICATION DOSE-MCNC: ABNORMAL
PERFORMED ON: NORMAL
POTASSIUM SERPL-SCNC: 4.1 MMOL/L (ref 3.5–5.1)
SODIUM SERPL-SCNC: 133 MMOL/L (ref 136–145)

## 2023-06-20 PROCEDURE — 6370000000 HC RX 637 (ALT 250 FOR IP): Performed by: EMERGENCY MEDICINE

## 2023-06-20 PROCEDURE — 80048 BASIC METABOLIC PNL TOTAL CA: CPT

## 2023-06-20 PROCEDURE — 99285 EMERGENCY DEPT VISIT HI MDM: CPT

## 2023-06-20 PROCEDURE — 6360000002 HC RX W HCPCS: Performed by: EMERGENCY MEDICINE

## 2023-06-20 PROCEDURE — 80175 DRUG SCREEN QUAN LAMOTRIGINE: CPT

## 2023-06-20 PROCEDURE — 83605 ASSAY OF LACTIC ACID: CPT

## 2023-06-20 PROCEDURE — 80177 DRUG SCRN QUAN LEVETIRACETAM: CPT

## 2023-06-20 PROCEDURE — 96374 THER/PROPH/DIAG INJ IV PUSH: CPT

## 2023-06-20 RX ORDER — LORAZEPAM 2 MG/ML
1 INJECTION INTRAMUSCULAR ONCE
Status: COMPLETED | OUTPATIENT
Start: 2023-06-20 | End: 2023-06-20

## 2023-06-20 RX ORDER — OXYCODONE HYDROCHLORIDE AND ACETAMINOPHEN 5; 325 MG/1; MG/1
1 TABLET ORAL ONCE
Status: COMPLETED | OUTPATIENT
Start: 2023-06-20 | End: 2023-06-20

## 2023-06-20 RX ORDER — LORAZEPAM 1 MG/1
1 TABLET ORAL 2 TIMES DAILY
Qty: 6 TABLET | Refills: 0 | Status: ON HOLD | OUTPATIENT
Start: 2023-06-20 | End: 2023-06-21

## 2023-06-20 RX ADMIN — OXYCODONE HYDROCHLORIDE AND ACETAMINOPHEN 1 TABLET: 5; 325 TABLET ORAL at 21:21

## 2023-06-20 RX ADMIN — LORAZEPAM 1 MG: 2 INJECTION INTRAMUSCULAR; INTRAVENOUS at 19:20

## 2023-06-20 ASSESSMENT — PAIN DESCRIPTION - LOCATION: LOCATION: NECK;HEAD

## 2023-06-20 ASSESSMENT — LIFESTYLE VARIABLES: HOW OFTEN DO YOU HAVE A DRINK CONTAINING ALCOHOL: NEVER

## 2023-06-20 ASSESSMENT — PAIN SCALES - GENERAL: PAINLEVEL_OUTOF10: 8

## 2023-06-20 ASSESSMENT — PAIN DESCRIPTION - DESCRIPTORS: DESCRIPTORS: CRUSHING

## 2023-06-20 NOTE — ED NOTES
This RN attempted to call pt's aunt as listed in emergency contacts with pt consent and request. No answer.       Pallavi Farias RN  06/20/23 1955

## 2023-06-20 NOTE — ED PROVIDER NOTES
medications. They also paged the neurologist on call for the epilepsy team.  She was in agreement with that treatment plan as well. Social Determinants : None    Records Reviewed (Source): [facility] / Medications / [address]    ED visits on 6/2/2023, 6/5/2023 and 6/13/2023 for breakthrough seizures. CC/HPI Summary, DDx, ED Course, and Reassessment: This patient has a refractory seizure disorder. She is on Onfi, Lamictal, and Keppra. She came in after a seizure at her sister's house. She was awake and alert on arrival here. She had no complaints. Patient is afebrile with a heart rate of 76, respirate of 20 and blood pressure 99/70. Her oxygen saturations 99%. Her HEENT exam was normal.  No signs of head facial oral trauma. She has normal cardiopulmonary exam.  She had a benign abdomen. She was awake alert and oriented. She had a normal neurologic exam.  She had no extremity trauma or any other injuries from procedure. Her sodium is 133. Her potassium is 4.1. Her bicarb is normal at 25. Her lactic acid is 1.2. Her Keppra level was 55.6. Her Lamictal level is pending and will not result tonight. While in the emergency department she had an episode that the nurse described as some facial twitching that she thought was a seizure. I walked into the room immediately when reported by the nurse. I saw no evidence of any twitching or seizure activity. I began talking to the patient as soon as I walked in the room and she responded immediately. As noted above this is a patient's fourth visit to an emergency department in the month of June procedures. She had a visit on 6/13/2023 at [facility]. They had talked with her at that time about admission and EEG monitoring. She declined admission at that time. The patient reported this to me as well, and wanted me to call  to see if she could be admitted.   As noted above I talk with 2 different neurologist, Dr. Austyn Arana in the physician on for the epilepsy team.

## 2023-06-20 NOTE — ED TRIAGE NOTES
Pt at sisters house family called EMS for a seizure. BG in EMS was 82. EMS saw no witness of seizure at home but patient began to seize as she was being wheeled into hospital. VSS on room air. EMS upon arrival to ED 89.

## 2023-06-21 ENCOUNTER — HOSPITAL ENCOUNTER (INPATIENT)
Age: 53
LOS: 2 days | Discharge: HOME | DRG: 053 | End: 2023-06-23
Attending: EMERGENCY MEDICINE | Admitting: INTERNAL MEDICINE
Payer: MEDICAID

## 2023-06-21 ENCOUNTER — APPOINTMENT (OUTPATIENT)
Dept: MRI IMAGING | Age: 53
DRG: 053 | End: 2023-06-21
Payer: MEDICAID

## 2023-06-21 ENCOUNTER — APPOINTMENT (OUTPATIENT)
Dept: CT IMAGING | Age: 53
DRG: 053 | End: 2023-06-21
Payer: MEDICAID

## 2023-06-21 DIAGNOSIS — G40.919 BREAKTHROUGH SEIZURE (HCC): Primary | ICD-10-CM

## 2023-06-21 LAB
AMPHETAMINES UR QL SCN>1000 NG/ML: ABNORMAL
ANION GAP SERPL CALCULATED.3IONS-SCNC: 11 MMOL/L (ref 3–16)
BARBITURATES UR QL SCN>200 NG/ML: ABNORMAL
BASOPHILS # BLD: 0.1 K/UL (ref 0–0.2)
BASOPHILS NFR BLD: 0.9 %
BENZODIAZ UR QL SCN>200 NG/ML: ABNORMAL
BILIRUB UR QL STRIP.AUTO: NEGATIVE
BUN SERPL-MCNC: 13 MG/DL (ref 7–20)
CALCIUM SERPL-MCNC: 9.1 MG/DL (ref 8.3–10.6)
CANNABINOIDS UR QL SCN>50 NG/ML: ABNORMAL
CHLORIDE SERPL-SCNC: 101 MMOL/L (ref 99–110)
CLARITY UR: CLEAR
CO2 SERPL-SCNC: 26 MMOL/L (ref 21–32)
COCAINE UR QL SCN: ABNORMAL
COLOR UR: YELLOW
CREAT SERPL-MCNC: 1.2 MG/DL (ref 0.6–1.1)
DEPRECATED RDW RBC AUTO: 15.3 % (ref 12.4–15.4)
DRUG SCREEN COMMENT UR-IMP: ABNORMAL
EOSINOPHIL # BLD: 0.1 K/UL (ref 0–0.6)
EOSINOPHIL NFR BLD: 2.1 %
FENTANYL SCREEN, URINE: ABNORMAL
GFR SERPLBLD CREATININE-BSD FMLA CKD-EPI: 55 ML/MIN/{1.73_M2}
GLUCOSE SERPL-MCNC: 121 MG/DL (ref 70–99)
GLUCOSE UR STRIP.AUTO-MCNC: NEGATIVE MG/DL
HCT VFR BLD AUTO: 37.3 % (ref 36–48)
HGB BLD-MCNC: 11.7 G/DL (ref 12–16)
HGB UR QL STRIP.AUTO: NEGATIVE
KETONES UR STRIP.AUTO-MCNC: NEGATIVE MG/DL
LEUKOCYTE ESTERASE UR QL STRIP.AUTO: NEGATIVE
LEVETIRACETAM SERPL-MCNC: >100 UG/ML (ref 6–46)
LYMPHOCYTES # BLD: 2 K/UL (ref 1–5.1)
LYMPHOCYTES NFR BLD: 32.7 %
MCH RBC QN AUTO: 23.9 PG (ref 26–34)
MCHC RBC AUTO-ENTMCNC: 31.4 G/DL (ref 31–36)
MCV RBC AUTO: 76 FL (ref 80–100)
MEDICATION DOSE-MCNC: ABNORMAL
METHADONE UR QL SCN>300 NG/ML: ABNORMAL
MONOCYTES # BLD: 0.7 K/UL (ref 0–1.3)
MONOCYTES NFR BLD: 11 %
NEUTROPHILS # BLD: 3.2 K/UL (ref 1.7–7.7)
NEUTROPHILS NFR BLD: 53.3 %
NITRITE UR QL STRIP.AUTO: NEGATIVE
OPIATES UR QL SCN>300 NG/ML: ABNORMAL
OXYCODONE UR QL SCN: POSITIVE
PCP UR QL SCN>25 NG/ML: POSITIVE
PH UR STRIP.AUTO: 7 [PH] (ref 5–8)
PH UR STRIP: 7 [PH]
PLATELET # BLD AUTO: 176 K/UL (ref 135–450)
PMV BLD AUTO: 8.2 FL (ref 5–10.5)
POTASSIUM SERPL-SCNC: 3.6 MMOL/L (ref 3.5–5.1)
PROT UR STRIP.AUTO-MCNC: NEGATIVE MG/DL
RBC # BLD AUTO: 4.9 M/UL (ref 4–5.2)
SODIUM SERPL-SCNC: 138 MMOL/L (ref 136–145)
SP GR UR STRIP.AUTO: 1.02 (ref 1–1.03)
UA DIPSTICK W REFLEX MICRO PNL UR: NORMAL
URN SPEC COLLECT METH UR: NORMAL
UROBILINOGEN UR STRIP-ACNC: 0.2 E.U./DL
WBC # BLD AUTO: 6 K/UL (ref 4–11)

## 2023-06-21 PROCEDURE — 94760 N-INVAS EAR/PLS OXIMETRY 1: CPT

## 2023-06-21 PROCEDURE — 80048 BASIC METABOLIC PNL TOTAL CA: CPT

## 2023-06-21 PROCEDURE — 70450 CT HEAD/BRAIN W/O DYE: CPT

## 2023-06-21 PROCEDURE — 80175 DRUG SCREEN QUAN LAMOTRIGINE: CPT

## 2023-06-21 PROCEDURE — 80307 DRUG TEST PRSMV CHEM ANLYZR: CPT

## 2023-06-21 PROCEDURE — 6360000002 HC RX W HCPCS: Performed by: EMERGENCY MEDICINE

## 2023-06-21 PROCEDURE — 96374 THER/PROPH/DIAG INJ IV PUSH: CPT

## 2023-06-21 PROCEDURE — 99285 EMERGENCY DEPT VISIT HI MDM: CPT

## 2023-06-21 PROCEDURE — 6370000000 HC RX 637 (ALT 250 FOR IP): Performed by: INTERNAL MEDICINE

## 2023-06-21 PROCEDURE — 2580000003 HC RX 258: Performed by: INTERNAL MEDICINE

## 2023-06-21 PROCEDURE — 2060000000 HC ICU INTERMEDIATE R&B

## 2023-06-21 PROCEDURE — 6360000002 HC RX W HCPCS: Performed by: INTERNAL MEDICINE

## 2023-06-21 PROCEDURE — 85025 COMPLETE CBC W/AUTO DIFF WBC: CPT

## 2023-06-21 PROCEDURE — 80177 DRUG SCRN QUAN LEVETIRACETAM: CPT

## 2023-06-21 PROCEDURE — 2580000003 HC RX 258: Performed by: EMERGENCY MEDICINE

## 2023-06-21 PROCEDURE — 6370000000 HC RX 637 (ALT 250 FOR IP): Performed by: STUDENT IN AN ORGANIZED HEALTH CARE EDUCATION/TRAINING PROGRAM

## 2023-06-21 PROCEDURE — 6360000004 HC RX CONTRAST MEDICATION: Performed by: NURSE PRACTITIONER

## 2023-06-21 PROCEDURE — 70553 MRI BRAIN STEM W/O & W/DYE: CPT

## 2023-06-21 PROCEDURE — 81003 URINALYSIS AUTO W/O SCOPE: CPT

## 2023-06-21 PROCEDURE — A9577 INJ MULTIHANCE: HCPCS | Performed by: NURSE PRACTITIONER

## 2023-06-21 PROCEDURE — 72125 CT NECK SPINE W/O DYE: CPT

## 2023-06-21 RX ORDER — LORAZEPAM 1 MG/1
1 TABLET ORAL 2 TIMES DAILY
Status: DISCONTINUED | OUTPATIENT
Start: 2023-06-21 | End: 2023-06-23 | Stop reason: HOSPADM

## 2023-06-21 RX ORDER — LAMOTRIGINE 200 MG/1
400 TABLET ORAL 2 TIMES DAILY
COMMUNITY

## 2023-06-21 RX ORDER — LAMOTRIGINE 100 MG/1
400 TABLET ORAL 2 TIMES DAILY
Status: DISCONTINUED | OUTPATIENT
Start: 2023-06-21 | End: 2023-06-22

## 2023-06-21 RX ORDER — SODIUM CHLORIDE 9 MG/ML
INJECTION, SOLUTION INTRAVENOUS PRN
Status: DISCONTINUED | OUTPATIENT
Start: 2023-06-21 | End: 2023-06-23 | Stop reason: HOSPADM

## 2023-06-21 RX ORDER — ACETAMINOPHEN 650 MG/1
650 SUPPOSITORY RECTAL EVERY 4 HOURS PRN
Status: DISCONTINUED | OUTPATIENT
Start: 2023-06-21 | End: 2023-06-23 | Stop reason: HOSPADM

## 2023-06-21 RX ORDER — ONDANSETRON 2 MG/ML
4 INJECTION INTRAMUSCULAR; INTRAVENOUS EVERY 4 HOURS PRN
Status: DISCONTINUED | OUTPATIENT
Start: 2023-06-21 | End: 2023-06-23 | Stop reason: HOSPADM

## 2023-06-21 RX ORDER — 0.9 % SODIUM CHLORIDE 0.9 %
1000 INTRAVENOUS SOLUTION INTRAVENOUS ONCE
Status: COMPLETED | OUTPATIENT
Start: 2023-06-21 | End: 2023-06-21

## 2023-06-21 RX ORDER — LORAZEPAM 2 MG/ML
2 INJECTION INTRAMUSCULAR PRN
Status: DISCONTINUED | OUTPATIENT
Start: 2023-06-21 | End: 2023-06-23 | Stop reason: HOSPADM

## 2023-06-21 RX ORDER — LORAZEPAM 1 MG/1
1 TABLET ORAL 2 TIMES DAILY
Status: DISCONTINUED | OUTPATIENT
Start: 2023-06-21 | End: 2023-06-21 | Stop reason: ALTCHOICE

## 2023-06-21 RX ORDER — POLYETHYLENE GLYCOL 3350 17 G/17G
17 POWDER, FOR SOLUTION ORAL DAILY PRN
Status: DISCONTINUED | OUTPATIENT
Start: 2023-06-21 | End: 2023-06-23 | Stop reason: HOSPADM

## 2023-06-21 RX ORDER — ENOXAPARIN SODIUM 100 MG/ML
40 INJECTION SUBCUTANEOUS DAILY
Status: DISCONTINUED | OUTPATIENT
Start: 2023-06-21 | End: 2023-06-23 | Stop reason: HOSPADM

## 2023-06-21 RX ORDER — SODIUM CHLORIDE 0.9 % (FLUSH) 0.9 %
10 SYRINGE (ML) INJECTION EVERY 12 HOURS SCHEDULED
Status: DISCONTINUED | OUTPATIENT
Start: 2023-06-21 | End: 2023-06-23 | Stop reason: HOSPADM

## 2023-06-21 RX ORDER — LORAZEPAM 2 MG/ML
1 INJECTION INTRAMUSCULAR ONCE
Status: DISCONTINUED | OUTPATIENT
Start: 2023-06-21 | End: 2023-06-21

## 2023-06-21 RX ORDER — DULOXETIN HYDROCHLORIDE 30 MG/1
30 CAPSULE, DELAYED RELEASE ORAL DAILY
Status: DISCONTINUED | OUTPATIENT
Start: 2023-06-21 | End: 2023-06-23 | Stop reason: HOSPADM

## 2023-06-21 RX ORDER — LEVETIRACETAM 500 MG/1
2000 TABLET ORAL 2 TIMES DAILY
Status: DISCONTINUED | OUTPATIENT
Start: 2023-06-21 | End: 2023-06-21

## 2023-06-21 RX ORDER — SODIUM CHLORIDE 0.9 % (FLUSH) 0.9 %
10 SYRINGE (ML) INJECTION PRN
Status: DISCONTINUED | OUTPATIENT
Start: 2023-06-21 | End: 2023-06-23 | Stop reason: HOSPADM

## 2023-06-21 RX ORDER — ACETAMINOPHEN 325 MG/1
650 TABLET ORAL EVERY 4 HOURS PRN
Status: DISCONTINUED | OUTPATIENT
Start: 2023-06-21 | End: 2023-06-23 | Stop reason: HOSPADM

## 2023-06-21 RX ORDER — LORAZEPAM 2 MG/ML
2 INJECTION INTRAMUSCULAR ONCE
Status: COMPLETED | OUTPATIENT
Start: 2023-06-21 | End: 2023-06-21

## 2023-06-21 RX ORDER — MAGNESIUM SULFATE 1 G/100ML
1000 INJECTION INTRAVENOUS ONCE
Status: COMPLETED | OUTPATIENT
Start: 2023-06-21 | End: 2023-06-21

## 2023-06-21 RX ORDER — CLOBAZAM 10 MG/1
10 TABLET ORAL NIGHTLY
Status: DISCONTINUED | OUTPATIENT
Start: 2023-06-21 | End: 2023-06-21

## 2023-06-21 RX ADMIN — ONDANSETRON 4 MG: 2 INJECTION INTRAMUSCULAR; INTRAVENOUS at 07:36

## 2023-06-21 RX ADMIN — LORAZEPAM 1 MG: 1 TABLET ORAL at 20:58

## 2023-06-21 RX ADMIN — SODIUM CHLORIDE 1000 ML: 9 INJECTION, SOLUTION INTRAVENOUS at 04:27

## 2023-06-21 RX ADMIN — LAMOTRIGINE 400 MG: 100 TABLET ORAL at 20:57

## 2023-06-21 RX ADMIN — Medication 10 ML: at 07:37

## 2023-06-21 RX ADMIN — LAMOTRIGINE 400 MG: 100 TABLET ORAL at 12:22

## 2023-06-21 RX ADMIN — MAGNESIUM SULFATE HEPTAHYDRATE 1000 MG: 1 INJECTION, SOLUTION INTRAVENOUS at 10:37

## 2023-06-21 RX ADMIN — POTASSIUM BICARBONATE 40 MEQ: 782 TABLET, EFFERVESCENT ORAL at 10:33

## 2023-06-21 RX ADMIN — GADOBENATE DIMEGLUMINE 16 ML: 529 INJECTION, SOLUTION INTRAVENOUS at 14:03

## 2023-06-21 RX ADMIN — Medication 10 ML: at 20:58

## 2023-06-21 RX ADMIN — LEVETIRACETAM 2000 MG: 500 TABLET, FILM COATED ORAL at 12:23

## 2023-06-21 RX ADMIN — LEVETIRACETAM 2000 MG: 500 TABLET, FILM COATED ORAL at 20:57

## 2023-06-21 RX ADMIN — DULOXETINE HYDROCHLORIDE 30 MG: 30 CAPSULE, DELAYED RELEASE ORAL at 10:33

## 2023-06-21 RX ADMIN — LORAZEPAM 2 MG: 2 INJECTION INTRAMUSCULAR; INTRAVENOUS at 04:20

## 2023-06-21 ASSESSMENT — ENCOUNTER SYMPTOMS
COUGH: 0
SHORTNESS OF BREATH: 0
VOMITING: 0
EYE ITCHING: 0
ABDOMINAL PAIN: 0
EYE DISCHARGE: 0
COLOR CHANGE: 0
CONSTIPATION: 0

## 2023-06-21 ASSESSMENT — PAIN - FUNCTIONAL ASSESSMENT
PAIN_FUNCTIONAL_ASSESSMENT: PREVENTS OR INTERFERES SOME ACTIVE ACTIVITIES AND ADLS
PAIN_FUNCTIONAL_ASSESSMENT: 0-10

## 2023-06-21 ASSESSMENT — PAIN SCALES - GENERAL
PAINLEVEL_OUTOF10: 0
PAINLEVEL_OUTOF10: 7

## 2023-06-21 ASSESSMENT — PAIN SCALES - WONG BAKER
WONGBAKER_NUMERICALRESPONSE: 0
WONGBAKER_NUMERICALRESPONSE: 0

## 2023-06-21 ASSESSMENT — LIFESTYLE VARIABLES: HOW OFTEN DO YOU HAVE A DRINK CONTAINING ALCOHOL: NEVER

## 2023-06-21 ASSESSMENT — PAIN DESCRIPTION - LOCATION: LOCATION: NECK

## 2023-06-21 NOTE — PROGRESS NOTES
Hospital Medicine Progress Note     Date:  6/21/2023    PCP: Patricio Tapia MD (Tel: [phone])    Date of Admission: 6/21/2023    Chief complaint:   Chief Complaint   Patient presents with    Seizures     Pt was seen earlier for same and discharged home after consult to  pt also states she fell down stairs while having a seizure with c/o neck pain       Brief admission history: 61-year-old female with history of microcytic anemia, depression, headaches, seizure disorder, who typically follows with neurology service at [facility], who presented to the emergency room twice on 6/20/2023 for seizure. She was initially in the emergency room, loaded with Ativan bridge and discharged home, per neurology recommendation. She arrived home and had another episode of partial seizure, brought to the emergency room, and [facility] neurology service recommended admission (as there were no beds at [facility]). She had unremarkable CT-head in the emergency room. CT-cervical spine was also unremarkable for acute pathology. Assessment/plan:  Breakthrough seizure, partial seizure. Continue antiepileptic drugs as ordered. Maintain on seizure precautions. Await neurology evaluation. Patient will follow-up with her neurologist at [facility]. MRI-brain and EEG currently pending per neurology order. Other comorbidities: history of microcytic anemia, depression, headaches, seizure disorder. Diet: ADULT DIET; Regular    Code status: Full Code   ----------  Subjective  No new seizures since admission. Objective  Physical exam:  Vitals: /78   Pulse (!) 103   Temp 97.9 °F (36.6 °C) (Oral)   Resp 20   Ht 5' 9\" (1.753 m)   Wt 183 lb 10.3 oz (83.3 kg)   LMP  (LMP Unknown)   SpO2 93%   BMI 27.12 kg/m²   Gen/overall appearance: Not in acute distress. Alert. Oriented x3.   Head: Normocephalic, atraumatic  Eyes: EOMI, good acuity  ENT: Oral

## 2023-06-21 NOTE — PROGRESS NOTES
4 Eyes Skin Assessment     NAME:  Choco Wesley  YOB: 1970  MEDICAL RECORD NUMBER:  [id number]    The patient is being assessed for  Admission    I agree that at least one RN has performed a thorough Head to Toe Skin Assessment on the patient. ALL assessment sites listed below have been assessed. Areas assessed by both nurses:    Head, Face, Ears, Shoulders, Back, Chest, Arms, Elbows, Hands, Sacrum. Buttock, Coccyx, Ischium, and Legs. Feet and Heels        Does the Patient have a Wound?  No noted wound(s)       Anthony Prevention initiated by RN: Yes  Wound Care Orders initiated by RN: No    Pressure Injury (Stage 3,4, Unstageable, DTI, NWPT, and Complex wounds) if present, place Wound referral order by RN under : No    New Ostomies, if present place, Ostomy referral order under : No     Nurse 1 eSignature: Electronically signed by Robert Manjarrez RN on 6/21/23 at 6:42 AM EDT    **SHARE this note so that the co-signing nurse can place an eSignature**    Nurse 2 eSignature: Electronically signed by Jerica Loco RN on 6/21/23 at 7:52 AM EDT

## 2023-06-21 NOTE — PROGRESS NOTES
Pt is more alert and oriented, more appropriate in response and conversation. No seizure activity noted. Call light within reach. Bed alarm is on.

## 2023-06-21 NOTE — PROGRESS NOTES
Pt transferred from ED to [facility]. Monitor applied, MW notified. Oriented pt to room, but patient very sleepy and not answering questions coherently.

## 2023-06-21 NOTE — PROGRESS NOTES
Patient is more alert, oriented able to answer questions. Pt is slow to respond but is responding. Tele camera placed in room due to pt climbing out of bed. Explained purpose of camera to pt. Verbalized understanding. Breakfast ordered for pt. Pt given medications as ordered. Call light within reach. Bed alarm is on.

## 2023-06-21 NOTE — ED NOTES
Provider order placed for patient's discharge. Provider reviewed decision to discharge with the patient. Discharge paperwork and any prescriptions were reviewed with the patient. Patient verbalized understanding of discharge education and any prescriptions and has no further questions or further needs at this time. Patient left with all personal belongings and was stable upon departure. Patient thanked for choosing [address] and informed to return should any need arise.        Abner Libman, RN  06/20/23 0204

## 2023-06-21 NOTE — PROGRESS NOTES
Pt is resting in bed with eyes closed, large emesis noted on floor and on bed rail. Pt given zofran. Pt does open eyes to name but then closes eyes. Answers orientation questions and all other questions with eyes closed. Oriented to person, place, and time but not year. Pt is very soft spoken. She is not following all commands, such as when asked to take a deep breath she did not take deep breath even after asking several times. Respirations easy even no distress. No outward signs of pain. Pt is asking for food however she appears very drowsy concern for aspirations. Seizure precautions in place. Bed alarm on. Call light within reach.

## 2023-06-21 NOTE — PROGRESS NOTES
Dr. Carlos Meehan called this nurse and asked if the family can bring in patient's Briviact because the hospital does not carry this medication. If the family is able to bring the medication in then give the patient her Briviact instead of the [address] that is ordered. This nurse called 's [facility] [phone] and asked if she or someone else from the family can bring the [address] in to the hospital. Washington Diop said that she would try and bring it up later this evening. Hollie was instructed to give the medication to the Requ's nurse not Requel.

## 2023-06-21 NOTE — CONSULTS
NEUROLOGY CONSULT NOTE  Reason for Consult: Dr Natalee Yanez MD asked me to see Dario Madison in consultation for evaluation of breakthrough seizures. Follows at [facility], they have no neuro beds. Chief complaint: Breakthrough seizures    HISTORY OF PRESENT ILLNESS:  HPI was gathered from the patient at the bedside as well as EMR review. Patient is a poor historian. She indicated I should call her aunt, Keturah Henderson, for more information. Hollie states patient is taking Cherelle Connor, and Lamictal. However, Arie Javier is not listed in recent  Neurology office note. She does not think the patient has missed any doses, but she is not sure. She indicates patient started having seizures at the age of 1 months. Choco Aleman is a 50 y.o. female with a PMH that includes seizures, PNES, anemia, depression, and left side craniotomy for left anterior temporal lobectomy in 2018. See below for additional medical and surgical histories. Patient presented to the ED yesterday via EMS after a witnessed seizure at her sister's house. Patient also had a seizure upon arrival at the ED. Patient has known history of complex partial seizures that generalize as well as PNES. Seizures are poorly controlled despite being on Keppra, Onfi, and Lamictal.  ED physician spoke to [facility] neurology at [facility] and decision was made to bridge patient with Ativan and discharge her home. Patient returned to the ED this morning after another seizure at home. Patient has had multiple ED visits for breakthrough seizures recently:  6/13, 6/5, 6/2. She cannot confirm if she is taking her medications as prescribed. She is not sure if she has missed any doses. She denies any recent illnesses. I have reviewed  Neurology notes in detail. Patient's last office visit was  6/8/23. Plan was for brivaracetam 150 mg BID, and increase lamotrigine to 400 mg BID. No mention of Onfi in the office note.   Dr. Quynh Stern was also planning inpatient

## 2023-06-21 NOTE — PROGRESS NOTES
This nurse called pt's aunt to complete MRI form. Also verified phone numbers with pt's aunt. Patient ambulated to the bathroom, needed verbal cues as well as physical help to the bathroom. Urine specimen collected. Pt back in bed. Call light within reach. Bed alarm is on.

## 2023-06-21 NOTE — ED NOTES
Report given to Sugey Subramanian RN to assume care. All questions and concerns answered.       Sandor Sue RN  06/21/23 0559

## 2023-06-21 NOTE — H&P
Hospital Medicine  History and Physical    PCP: Patricio Tapia MD  Patient Name: Kate Florian    Date of Service: Pt seen/examined on 6/21/23 and admitted to Inpatient with expected LOS greater than two midnights due to medical therapy    CHIEF COMPLAINT:  Pt with recurrent breakthrough seizures  HISTORY OF PRESENT ILLNESS: Pt is an 50y.o. year-old female with a history that includes a seizure disorder. She is followed by [facility] Neurology for this. She has had multiple breakthrough seizures over the last few weeks. She was seen in this emergency room earlier this evening after she had a seizure. The emergency room provider spoke with [facility] Neurology on-call. She was loaded with an Ativan bridge and discharged to home. After she arrived home she had a partial seizure and was brought back to the emergency room. [facility] was again consulted and they recommended admission. Unfortunately, they do not have any neurology beds available. Her current imaging is negative for acute findings. Her baseline antiseizure medications include Lamictal, Onfi and Keppra. She is lethargic but easily awakens and answers questions accurately before falling back to sleep quickly. Pt denies fall or head trauma, and associated signs and symptoms do not include neck pain or stiffness, diplopia or other vision changes, difficulty swallowing, numbness in the arms or legs, or focal neurological symptoms not mentioned above. She is being admitted for further evaluation and treatment. Past Medical History:        Diagnosis Date    Anemia     Depression 09/16/2014    Headache(784.0)     Seizures ([address]     LAST ONE 6/5/23       Past Surgical History:        Procedure Laterality Date    BRAIN SURGERY      2019    BUNIONECTOMY Left 02/24/2023    LAPIDUS BUNIONECTOMY LEFT FOOT performed by Kaela Estrada DPM at [facility] ([address] [place] [address])      2021 per pt.     KNEE SURGERY Left

## 2023-06-21 NOTE — DISCHARGE INSTRUCTIONS
Continue your current seizure medications as prescribed. Take Ativan 2 times daily for the next 3 days. Keep your scheduled follow-up with neurology at [facility].

## 2023-06-21 NOTE — ED PROVIDER NOTES
I PERSONALLY SAW THE PATIENT AND PERFORMED A SUBSTANTIVE PORTION OF THE VISIT INCLUDING ALL ASPECTS OF THE MEDICAL DECISION MAKING PROCESS. [address]      Pt Name: Terryann Hammans  MRN: [id number]  Jayla 1970  Date of evaluation: 6/21/2023  Provider: Ace Elias MD    CHIEF COMPLAINT       Chief Complaint   Patient presents with    Seizures     Pt was seen earlier for same and discharged home after consult to  pt also states she fell down stairs while having a seizure with c/o neck pain       HISTORY OF PRESENT ILLNESS    Choco Talavera is a 50 y.o. female who presents to the emergency department with seizures. Patient presents with multiple seizures from home. Had a breakthrough seizure earlier tonight and seen in the emergency room. Discharged after consult with . Had another seizure today with mechanical fall. Positive for head trauma. Positive for 4-10 achy neck pain. No chest pain or shortness of breath. No weakness, saddle numbness, loss of bowel or bladder function. No other associated symptoms. Patient endorses compliance with her seizure medication. Patient has no abdominal pain. Nursing Notes were reviewed. Including nursing noted for FM, Surgical History, Past Medical History, Social History, vitals, and allergies; agree with all. REVIEW OF SYSTEMS       Review of Systems   Constitutional:  Negative for diaphoresis and unexpected weight change. HENT:  Negative for congestion, dental problem and drooling. Eyes:  Negative for discharge and itching. Respiratory:  Negative for cough and shortness of breath. Cardiovascular:  Negative for chest pain and leg swelling. Gastrointestinal:  Negative for abdominal pain, constipation and vomiting. Endocrine: Negative for cold intolerance and heat intolerance. Genitourinary:  Negative for vaginal bleeding, vaginal discharge and vaginal pain.

## 2023-06-22 LAB
ALBUMIN SERPL-MCNC: 4.2 G/DL (ref 3.4–5)
ALBUMIN/GLOB SERPL: 1.5 {RATIO} (ref 1.1–2.2)
ALP SERPL-CCNC: 61 U/L (ref 40–129)
ALT SERPL-CCNC: 11 U/L (ref 10–40)
ANION GAP SERPL CALCULATED.3IONS-SCNC: 8 MMOL/L (ref 3–16)
AST SERPL-CCNC: 15 U/L (ref 15–37)
BASOPHILS # BLD: 0 K/UL (ref 0–0.2)
BASOPHILS NFR BLD: 0.5 %
BILIRUB SERPL-MCNC: 0.3 MG/DL (ref 0–1)
BUN SERPL-MCNC: 10 MG/DL (ref 7–20)
CALCIUM SERPL-MCNC: 8.9 MG/DL (ref 8.3–10.6)
CHLORIDE SERPL-SCNC: 104 MMOL/L (ref 99–110)
CO2 SERPL-SCNC: 27 MMOL/L (ref 21–32)
CREAT SERPL-MCNC: 1.2 MG/DL (ref 0.6–1.1)
DEPRECATED RDW RBC AUTO: 15.2 % (ref 12.4–15.4)
EOSINOPHIL # BLD: 0.1 K/UL (ref 0–0.6)
EOSINOPHIL NFR BLD: 2.1 %
GFR SERPLBLD CREATININE-BSD FMLA CKD-EPI: 55 ML/MIN/{1.73_M2}
GLUCOSE SERPL-MCNC: 87 MG/DL (ref 70–99)
HCT VFR BLD AUTO: 35.1 % (ref 36–48)
HGB BLD-MCNC: 11.1 G/DL (ref 12–16)
LAMOTRIGINE SERPL-MCNC: 12.1 UG/ML (ref 3–15)
LAMOTRIGINE SERPL-MCNC: 23.9 UG/ML (ref 3–15)
LEVETIRACETAM SERPL-MCNC: >100 UG/ML (ref 6–46)
LYMPHOCYTES # BLD: 2.5 K/UL (ref 1–5.1)
LYMPHOCYTES NFR BLD: 44.4 %
MAGNESIUM SERPL-MCNC: 2 MG/DL (ref 1.8–2.4)
MCH RBC QN AUTO: 24 PG (ref 26–34)
MCHC RBC AUTO-ENTMCNC: 31.6 G/DL (ref 31–36)
MCV RBC AUTO: 75.9 FL (ref 80–100)
MEDICATION DOSE-MCNC: ABNORMAL
MONOCYTES # BLD: 0.5 K/UL (ref 0–1.3)
MONOCYTES NFR BLD: 8 %
NEUTROPHILS # BLD: 2.6 K/UL (ref 1.7–7.7)
NEUTROPHILS NFR BLD: 45 %
PHOSPHATE SERPL-MCNC: 3.1 MG/DL (ref 2.5–4.9)
PLATELET # BLD AUTO: 166 K/UL (ref 135–450)
PMV BLD AUTO: 8.1 FL (ref 5–10.5)
POTASSIUM SERPL-SCNC: 4.4 MMOL/L (ref 3.5–5.1)
PROT SERPL-MCNC: 7 G/DL (ref 6.4–8.2)
RBC # BLD AUTO: 4.62 M/UL (ref 4–5.2)
SODIUM SERPL-SCNC: 139 MMOL/L (ref 136–145)
WBC # BLD AUTO: 5.7 K/UL (ref 4–11)

## 2023-06-22 PROCEDURE — 80177 DRUG SCRN QUAN LEVETIRACETAM: CPT

## 2023-06-22 PROCEDURE — 84100 ASSAY OF PHOSPHORUS: CPT

## 2023-06-22 PROCEDURE — 95819 EEG AWAKE AND ASLEEP: CPT

## 2023-06-22 PROCEDURE — 2580000003 HC RX 258: Performed by: INTERNAL MEDICINE

## 2023-06-22 PROCEDURE — 6370000000 HC RX 637 (ALT 250 FOR IP): Performed by: STUDENT IN AN ORGANIZED HEALTH CARE EDUCATION/TRAINING PROGRAM

## 2023-06-22 PROCEDURE — 6370000000 HC RX 637 (ALT 250 FOR IP): Performed by: INTERNAL MEDICINE

## 2023-06-22 PROCEDURE — 2060000000 HC ICU INTERMEDIATE R&B

## 2023-06-22 PROCEDURE — 80053 COMPREHEN METABOLIC PANEL: CPT

## 2023-06-22 PROCEDURE — 85025 COMPLETE CBC W/AUTO DIFF WBC: CPT

## 2023-06-22 PROCEDURE — 36415 COLL VENOUS BLD VENIPUNCTURE: CPT

## 2023-06-22 PROCEDURE — 83735 ASSAY OF MAGNESIUM: CPT

## 2023-06-22 RX ORDER — LAMOTRIGINE 200 MG/1
400 TABLET ORAL 2 TIMES DAILY
Status: DISCONTINUED | OUTPATIENT
Start: 2023-06-22 | End: 2023-06-23 | Stop reason: HOSPADM

## 2023-06-22 RX ADMIN — LAMOTRIGINE 400 MG: 200 TABLET ORAL at 20:47

## 2023-06-22 RX ADMIN — LORAZEPAM 1 MG: 1 TABLET ORAL at 20:45

## 2023-06-22 RX ADMIN — LAMOTRIGINE 400 MG: 200 TABLET ORAL at 09:36

## 2023-06-22 RX ADMIN — Medication 10 ML: at 09:38

## 2023-06-22 RX ADMIN — DULOXETINE HYDROCHLORIDE 30 MG: 30 CAPSULE, DELAYED RELEASE ORAL at 09:36

## 2023-06-22 RX ADMIN — LORAZEPAM 1 MG: 1 TABLET ORAL at 09:36

## 2023-06-22 RX ADMIN — Medication 10 ML: at 20:46

## 2023-06-22 ASSESSMENT — PAIN SCALES - GENERAL: PAINLEVEL_OUTOF10: 0

## 2023-06-22 NOTE — PROGRESS NOTES
Patient slept well this shift. Patient is alert and oriented, able to make needs known. Patient ambulated multiple times to the restroom with assistance, tolerated well. Patient only set the bed alarm off once. When arriving to the bedside patient was found sitting on the side of the bed, stating that she had to use the restroom. This RN emphasized the importance of using the call light and asking for help when ambulating. Patient verbalized understanding. Patient is resting in bed at this time. Fall precautions and seizure precautions in place. Bed alarm is on and tele camera is in place.      Electronically signed by Marilia Hess RN on 6/22/2023 at 6:21 AM

## 2023-06-22 NOTE — PROGRESS NOTES
V2.0    [facility] Progress Note      Name:  Hector Steinberg /Age/Sex: 1970  (50 y.o. female)   MRN & CSN:  [id number] & [id number] Encounter Date/Time: 2023 4:45 PM EDT   Location:  [id number] PCP: MD Terrell Vick MD       Hospital Day: 2    Assessment and Recommendations         Plan:   Breakthrough seizure, partial seizure. Continue antiepileptic drugs as ordered. Maintain on seizure precautions. Patient will follow-up with her neurologist at [facility]. MRI brain reviewed, EEG pending  Continue current antiepileptic regimen per neurology    Other comorbidities: history of microcytic anemia, depression, headaches, seizure disorder. Discharge home once cleared by neurology, EEG pending    Diet ADULT DIET; Regular   DVT Prophylaxis [] Lovenox, []  Heparin, [] SCDs, [] Ambulation,  [] Eliquis, [] Xarelto  [] Coumadin   Code Status Full Code   Disposition    Surrogate Decision Maker/ POA             Subjective:     Doing well today with no complaints. Review of Systems:      Pertinent positives and negatives discussed in HPI    Objective: Intake/Output Summary (Last 24 hours) at 2023 1645  Last data filed at 2023 1641  Gross per 24 hour   Intake 1622.61 ml   Output 900 ml   Net 722.61 ml      Vitals:   Vitals:    23 2331 23 0438 23 0745 23 1120   BP: 99/64 106/80 107/68 119/75   Pulse: 88 77 78 (!) 108   Resp:    Temp: 98 °F (36.7 °C) 98.2 °F (36.8 °C) 98.2 °F (36.8 °C) 98.6 °F (37 °C)   TempSrc: Oral Oral Oral Oral   SpO2: 95% 96% 98%    Weight:  180 lb 1.9 oz (81.7 kg)     Height:             Physical Exam:      General: NAD  Eyes: EOMI  ENT: neck supple  Cardiovascular: Regular rate. Respiratory: Clear to auscultation  Gastrointestinal: Soft, non tender  Genitourinary: no suprapubic tenderness  Musculoskeletal: No edema  Skin: warm, dry  Neuro: Alert.   No obvious focal

## 2023-06-22 NOTE — DISCHARGE INSTRUCTIONS
Choco Wesley:    You have home medications stored in [facility] please have your nurse call [phone] and one our Certified Pharmacy Technicians will be happy to bring them to you before discharge.

## 2023-06-22 NOTE — PROGRESS NOTES
Patient ambulated greater than 200 feet in the nicholas, tolerated well. Gait steady. Pt back in bed.

## 2023-06-22 NOTE — PROGRESS NOTES
Patient's aunt came to the bedside and gave this RN patient's home medications. Briviact along with other medications were provided. Pharmacy was called and came to the bedside to retrieve all medications.      Electronically signed by January Garcia RN on 6/22/2023 at 12:11 AM

## 2023-06-22 NOTE — PROCEDURES
[address] [place] [address]                          ELECTROENCEPHALOGRAM REPORT    PATIENT NAME: Mehdi Steve                     :        1970  MED REC NO:   [id number]                          ROOM:       0144  ACCOUNT NO:   [de-identified]                           ADMIT DATE: 2023  PROVIDER:     Sanjeev Latham DO    DATE OF EE2023    REFERRING PROVIDER:  Mirta Ibrahim NP    REASON FOR STUDY:  Breakthrough seizures. BRIEF HISTORY AND NEUROLOGIC FINDINGS:  The patient is a 53-year-old  female being evaluated for reported breakthrough seizures. MEDICATIONS:  The patient's centrally-acting medications listed include  Ativan, Cymbalta, and Lamictal.    EEG FINDING:  This is a 20-channel digital EEG performed utilizing  bipolar and referential montages. Wakefulness, drowsiness and sleep  were obtained during the recording. During maximum wakefulness, there  was a moderate voltage, symmetric, fairly well-regulated and reactive 8  Hz posterior dominant background rhythm. The anterior background  consists of low voltage mixed frequencies. Drowsiness is manifested by  attenuation of the waking background rhythms. Sleep was manifested by  sleep spindles and K-complexes. There appeared to be some occasional sharp waves in the left temporal  region. These were frequently obscured by significant myogenic and  movement artifact in that region, though at times these did appear  epileptiform. No rhythmic activity or seizures were identified. Photic stimulation was performed at various flash frequencies and evoked  a symmetric posterior driving response at multiple frequencies. Hyperventilation exercise was not performed due to the patient's  clinical history. A 1-channel EKG rhythm strip was reviewed and showed no obvious cardiac  dysrhythmias.     EEG DIAGNOSIS:  Abnormal awake and asleep EEG

## 2023-06-22 NOTE — PROGRESS NOTES
Patient's Aunt and Grandma were in to visit. Pt tolerated that well. No seizure activity noted this shift. Pt is currently resting in bed. Call light within reach. Tele camera is in use. Bed alarm is on.

## 2023-06-23 VITALS
BODY MASS INDEX: 26.68 KG/M2 | TEMPERATURE: 97.9 F | SYSTOLIC BLOOD PRESSURE: 101 MMHG | WEIGHT: 180.12 LBS | HEART RATE: 90 BPM | HEIGHT: 69 IN | DIASTOLIC BLOOD PRESSURE: 64 MMHG | RESPIRATION RATE: 16 BRPM | OXYGEN SATURATION: 96 %

## 2023-06-23 LAB
ALBUMIN SERPL-MCNC: 4.1 G/DL (ref 3.4–5)
ALBUMIN/GLOB SERPL: 1.4 {RATIO} (ref 1.1–2.2)
ALP SERPL-CCNC: 69 U/L (ref 40–129)
ALT SERPL-CCNC: 10 U/L (ref 10–40)
ANION GAP SERPL CALCULATED.3IONS-SCNC: 12 MMOL/L (ref 3–16)
AST SERPL-CCNC: 14 U/L (ref 15–37)
BASOPHILS # BLD: 0 K/UL (ref 0–0.2)
BASOPHILS NFR BLD: 0.6 %
BILIRUB SERPL-MCNC: 0.3 MG/DL (ref 0–1)
BUN SERPL-MCNC: 10 MG/DL (ref 7–20)
CALCIUM SERPL-MCNC: 8.8 MG/DL (ref 8.3–10.6)
CHLORIDE SERPL-SCNC: 101 MMOL/L (ref 99–110)
CO2 SERPL-SCNC: 25 MMOL/L (ref 21–32)
CREAT SERPL-MCNC: 1.1 MG/DL (ref 0.6–1.1)
DEPRECATED RDW RBC AUTO: 15.4 % (ref 12.4–15.4)
EOSINOPHIL # BLD: 0.1 K/UL (ref 0–0.6)
EOSINOPHIL NFR BLD: 2.5 %
GFR SERPLBLD CREATININE-BSD FMLA CKD-EPI: >60 ML/MIN/{1.73_M2}
GLUCOSE SERPL-MCNC: 92 MG/DL (ref 70–99)
HCT VFR BLD AUTO: 36.4 % (ref 36–48)
HGB BLD-MCNC: 11.8 G/DL (ref 12–16)
LYMPHOCYTES # BLD: 2.6 K/UL (ref 1–5.1)
LYMPHOCYTES NFR BLD: 51 %
MAGNESIUM SERPL-MCNC: 1.8 MG/DL (ref 1.8–2.4)
MCH RBC QN AUTO: 24.4 PG (ref 26–34)
MCHC RBC AUTO-ENTMCNC: 32.4 G/DL (ref 31–36)
MCV RBC AUTO: 75.2 FL (ref 80–100)
MONOCYTES # BLD: 0.4 K/UL (ref 0–1.3)
MONOCYTES NFR BLD: 8.7 %
NEUTROPHILS # BLD: 1.9 K/UL (ref 1.7–7.7)
NEUTROPHILS NFR BLD: 37.2 %
PHOSPHATE SERPL-MCNC: 3.5 MG/DL (ref 2.5–4.9)
PLATELET # BLD AUTO: 181 K/UL (ref 135–450)
PMV BLD AUTO: 8.3 FL (ref 5–10.5)
POTASSIUM SERPL-SCNC: 4.1 MMOL/L (ref 3.5–5.1)
PROT SERPL-MCNC: 7.1 G/DL (ref 6.4–8.2)
RBC # BLD AUTO: 4.85 M/UL (ref 4–5.2)
SODIUM SERPL-SCNC: 138 MMOL/L (ref 136–145)
WBC # BLD AUTO: 5 K/UL (ref 4–11)

## 2023-06-23 PROCEDURE — 36415 COLL VENOUS BLD VENIPUNCTURE: CPT

## 2023-06-23 PROCEDURE — 6370000000 HC RX 637 (ALT 250 FOR IP): Performed by: INTERNAL MEDICINE

## 2023-06-23 PROCEDURE — 80053 COMPREHEN METABOLIC PANEL: CPT

## 2023-06-23 PROCEDURE — 83735 ASSAY OF MAGNESIUM: CPT

## 2023-06-23 PROCEDURE — 6370000000 HC RX 637 (ALT 250 FOR IP): Performed by: STUDENT IN AN ORGANIZED HEALTH CARE EDUCATION/TRAINING PROGRAM

## 2023-06-23 PROCEDURE — 85025 COMPLETE CBC W/AUTO DIFF WBC: CPT

## 2023-06-23 PROCEDURE — 84100 ASSAY OF PHOSPHORUS: CPT

## 2023-06-23 RX ADMIN — DULOXETINE HYDROCHLORIDE 30 MG: 30 CAPSULE, DELAYED RELEASE ORAL at 10:49

## 2023-06-23 RX ADMIN — LAMOTRIGINE 400 MG: 200 TABLET ORAL at 10:50

## 2023-06-23 RX ADMIN — LORAZEPAM 1 MG: 1 TABLET ORAL at 10:51

## 2023-06-23 NOTE — PROGRESS NOTES
Prepared patient for discharge. Removed IV. Reviewed discharge medications and appointments. Returned home meds from pharmacy. Walked with patient to [facility] where aunt was waiting to take her home.

## 2023-06-23 NOTE — PLAN OF CARE
Problem: Discharge Planning  Goal: Discharge to home or other facility with appropriate resources  6/22/2023 0024 by Stephanie Shah RN  Outcome: Progressing  Flowsheets (Taken 6/21/2023 2054)  Discharge to home or other facility with appropriate resources: Identify barriers to discharge with patient and caregiver     Problem: Pain  Goal: Verbalizes/displays adequate comfort level or baseline comfort level  6/22/2023 0024 by Stephanie Shah RN  Outcome: Progressing  Flowsheets (Taken 6/22/2023 0024)  Verbalizes/displays adequate comfort level or baseline comfort level:   Encourage patient to monitor pain and request assistance   Assess pain using appropriate pain scale   Administer analgesics based on type and severity of pain and evaluate response   Implement non-pharmacological measures as appropriate and evaluate response    Problem: Safety - Adult  Goal: Free from fall injury  6/22/2023 0024 by Stephanie Shah RN  Outcome: Progressing  Flowsheets (Taken 6/22/2023 0024)  Free From Fall Injury: Instruct family/caregiver on patient safety  Note: Fall risk assessment completed every shift. All precautions in place. Pt has call light within reach at all times. Room clear of clutter. Pt aware to call for assistance when getting up. Bed alarm in use, tele camera also in room for safety. Problem: ABCDS Injury Assessment  Goal: Absence of physical injury  6/22/2023 0024 by Stephanie Shah RN  Outcome: Progressing  Flowsheets (Taken 6/22/2023 0024)  Absence of Physical Injury: Implement safety measures based on patient assessment     Problem: Skin/Tissue Integrity  Goal: Absence of new skin breakdown  Description: 1. Monitor for areas of redness and/or skin breakdown  2. Assess vascular access sites hourly  3. Every 4-6 hours minimum:  Change oxygen saturation probe site  4.   Every 4-6 hours:  If on nasal continuous positive airway pressure, respiratory therapy assess nares and determine need for appliance change or
Problem: Discharge Planning  Goal: Discharge to home or other facility with appropriate resources  6/23/2023 0024 by Madisyn Pichardo RN  Outcome: Progressing  Flowsheets (Taken 6/22/2023 2046)  Discharge to home or other facility with appropriate resources: Identify barriers to discharge with patient and caregiver    Problem: Pain  Goal: Verbalizes/displays adequate comfort level or baseline comfort level  6/23/2023 0024 by Madisyn Pichardo RN  Outcome: Progressing  Flowsheets (Taken 6/23/2023 0024)  Verbalizes/displays adequate comfort level or baseline comfort level:   Encourage patient to monitor pain and request assistance   Assess pain using appropriate pain scale   Administer analgesics based on type and severity of pain and evaluate response   Implement non-pharmacological measures as appropriate and evaluate response     Problem: Safety - Adult  Goal: Free from fall injury  6/23/2023 0024 by Madisyn Pichardo RN  Outcome: Progressing  Flowsheets (Taken 6/22/2023 0024)  Free From Fall Injury: Instruct family/caregiver on patient safety     Problem: ABCDS Injury Assessment  Goal: Absence of physical injury  6/23/2023 0024 by Madisyn Pichardo RN  Outcome: Progressing  Flowsheets (Taken 6/22/2023 0024)  Absence of Physical Injury: Implement safety measures based on patient assessment     Problem: Skin/Tissue Integrity  Goal: Absence of new skin breakdown  Description: 1. Monitor for areas of redness and/or skin breakdown  2. Assess vascular access sites hourly  3. Every 4-6 hours minimum:  Change oxygen saturation probe site  4. Every 4-6 hours:  If on nasal continuous positive airway pressure, respiratory therapy assess nares and determine need for appliance change or resting period.   6/23/2023 0024 by Madisyn Pichardo RN  Outcome: Progressing
Problem: Discharge Planning  Goal: Discharge to home or other facility with appropriate resources  Outcome: Progressing  Flowsheets (Taken 6/22/2023 2322)  Discharge to home or other facility with appropriate resources:   Identify barriers to discharge with patient and caregiver   Arrange for needed discharge resources and transportation as appropriate   Identify discharge learning needs (meds, wound care, etc)   Refer to discharge planning if patient needs post-hospital services based on physician order or complex needs related to functional status, cognitive ability or social support system     Problem: Pain  Goal: Verbalizes/displays adequate comfort level or baseline comfort level  Outcome: Progressing  Note: Patient has offered no complaints of pain. No outward signs of pain noted. Problem: Safety - Adult  Goal: Free from fall injury  Outcome: Progressing  Note: Fall risk assessment completed every shift. All precautions in place. Pt has call light within reach at all times. Room clear of clutter. Pt aware to call for assistance when getting up. Tele camera in place, bed alarm on. Pt has been compliant with calling for help prior to getting up. Problem: ABCDS Injury Assessment  Goal: Absence of physical injury  Outcome: Progressing  Note: No signs of physical injury. Problem: Skin/Tissue Integrity  Goal: Absence of new skin breakdown  Description: 1. Monitor for areas of redness and/or skin breakdown  2. Assess vascular access sites hourly  3. Every 4-6 hours minimum:  Change oxygen saturation probe site  4. Every 4-6 hours:  If on nasal continuous positive airway pressure, respiratory therapy assess nares and determine need for appliance change or resting period. Outcome: Progressing  Note: Skin assessment completed every shift. Pt assessed for incontinence, appropriate barrier cream applied prn. Pt encouraged to turn/rotate every 2 hours.  Assistance provided if pt unable to do so
Problem: Discharge Planning  Goal: Discharge to home or other facility with appropriate resources  Outcome: Progressing  Note: Prior to admission pt was living at home, her son comes and goes doesn't stay consistently. Social work/case management available. Problem: Pain  Goal: Verbalizes/displays adequate comfort level or baseline comfort level  Outcome: Progressing  Note: Pain/discomfort being managed with PRN analgesics per MD orders. Pt able to express presence and absence of pain and rate pain appropriately using numerical scale. Problem: Safety - Adult  Goal: Free from fall injury  Outcome: Progressing  Note: Fall risk assessment completed every shift. All precautions in place. Pt has call light within reach at all times. Room clear of clutter. Pt aware to call for assistance when getting up. Bed alarm in use, tele camera also in room for safety. Problem: ABCDS Injury Assessment  Goal: Absence of physical injury  Outcome: Progressing  Note: No signs of physical injury. Problem: Skin/Tissue Integrity  Goal: Absence of new skin breakdown  Description: 1. Monitor for areas of redness and/or skin breakdown  2. Assess vascular access sites hourly  3. Every 4-6 hours minimum:  Change oxygen saturation probe site  4. Every 4-6 hours:  If on nasal continuous positive airway pressure, respiratory therapy assess nares and determine need for appliance change or resting period. Outcome: Progressing  Note: Skin assessment completed every shift. Pt assessed for incontinence, appropriate barrier cream applied prn. Pt encouraged to turn/rotate every 2 hours. Assistance provided if pt unable to do so themselves.
complete
No

## 2023-06-23 NOTE — CARE COORDINATION
Discharge order noted. SW reached out to patient via telephone and she denies needs. Aunt is outside waiting on patient to be escorted down. Respectfully submitted,    Jackie Su MSW, [facility]   [phone]    Electronically signed by TOMMY Mccartney, LSW on 6/23/2023 at 1:47 PM

## 2023-06-23 NOTE — PROGRESS NOTES
Levetiracetem level was over 100. Call placed to neurology to advise. Cheng Torres NP says no changes to discharge medications are necessary. Notified Dr. Sibley .

## 2023-06-23 NOTE — DISCHARGE SUMMARY
V2.0  Discharge Summary    Name:  Lisa Ledezma /Age/Sex: 1970 (50 y.o. female)   Admit Date: 2023  Discharge Date: 23    MRN & CSN:  [id number] & [id number] Encounter Date and Time 23 12:45 PM EDT    Attending:  Mack Ware MD Discharging Provider: Mack Ware MD       Hospital Course:     Brief HPI:  50y.o. year-old female with a history that includes a seizure disorder. She is followed by [facility] for this. She has had multiple breakthrough seizures over the last few weeks. She was seen in this emergency room earlier this evening after she had a seizure. The emergency room provider spoke with [facility] on-call. She was loaded with an Ativan bridge and discharged to home. After she arrived home she had a partial seizure and was brought back to the emergency room. [facility] was again consulted and they recommended admission. Unfortunately, they do not have any neurology beds available. Her current imaging is negative for acute findings. Her baseline antiseizure medications include Lamictal, Onfi and Keppra. She is lethargic but easily awakens and answers questions accurately before falling back to sleep quickly. Pt denies fall or head trauma, and associated signs and symptoms do not include neck pain or stiffness, diplopia or other vision changes, difficulty swallowing, numbness in the arms or legs, or focal neurological symptoms not mentioned above. She is being admitted for further evaluation and treatment  Brief Problem Based Course:   Breakthrough seizure, partial seizure. Continue antiepileptic drugs as ordered. Maintain on seizure precautions. Patient will follow-up with her neurologist at [facility]. MRI brain reviewed, EEG without seizure  Continue current antiepileptic regimen per neurology     Other comorbidities: history of microcytic anemia, depression, headaches, seizure disorder.       The

## 2023-06-23 NOTE — PROGRESS NOTES
NEUROLOGY PROGRESS NOTE  Reason for Consult: Dr Sasha Gomes MD asked me to see Juanis Sandoval in consultation for evaluation of breakthrough seizures. Follows at [facility], they have no neuro beds. Chief complaint: Breakthrough seizures    UPDATE 6/23/23  Patient is awake and alert sitting up in bed eating breakfast.  No specific complaints. She is hoping for discharge. No further seizure activities during admission. There was no family at the bedside at the time of evaluation. MEDICAL HISTORY:  Past Medical History:   Diagnosis Date    Anemia     Depression 09/16/2014    Headache(784.0)     Schizophrenia ([facility] [place] [address]     Seizures ([facility] [place] [address]     LAST ONE 6/5/23     Past Surgical History:   Procedure Laterality Date    BRAIN SURGERY      2019    BUNIONECTOMY Left 02/24/2023    LAPIDUS BUNIONECTOMY LEFT FOOT performed by Chacho Sheppard DPM at [place] [address] ([facility])      2021 per pt. KNEE SURGERY Left     TUBAL LIGATION       Scheduled Meds:   lamoTRIgine  400 mg Oral BID    DULoxetine  30 mg Oral Daily    sodium chloride flush  10 mL IntraVENous 2 times per day    enoxaparin  40 mg SubCUTAneous Daily    LORazepam  1 mg Oral BID    Brivaracetam  150 mg Oral BID     Continuous Infusions:   sodium chloride       PRN Meds:.sodium chloride flush, sodium chloride, ondansetron, polyethylene glycol, acetaminophen **OR** acetaminophen, LORazepam    Medications Prior to Admission: Cholecalciferol 50 MCG (2000 UT) TABS, Take 1 tablet by mouth daily  lamoTRIgine (LAMICTAL) 200 MG tablet, Take 2 tablets by mouth 2 times daily  Brivaracetam 75 MG TABS, Take 150 mg by mouth 2 times daily. [DISCONTINUED] LORazepam (ATIVAN) 1 MG tablet, Take 1 tablet by mouth 2 times daily for 3 days.  Max Daily Amount: 2 mg  DULoxetine (CYMBALTA) 30 MG extended release capsule, Take 1 capsule by mouth daily  [DISCONTINUED] levETIRAcetam (KEPPRA) 1000 MG tablet, Take 2 tablets by mouth 2 times daily  [DISCONTINUED]

## 2023-06-26 ENCOUNTER — HOSPITAL ENCOUNTER (EMERGENCY)
Age: 53
Discharge: HOME | End: 2023-06-26
Attending: EMERGENCY MEDICINE
Payer: MEDICAID

## 2023-06-26 ENCOUNTER — APPOINTMENT (OUTPATIENT)
Dept: CT IMAGING | Age: 53
End: 2023-06-26
Payer: MEDICAID

## 2023-06-26 VITALS
DIASTOLIC BLOOD PRESSURE: 69 MMHG | HEIGHT: 68 IN | RESPIRATION RATE: 14 BRPM | SYSTOLIC BLOOD PRESSURE: 108 MMHG | WEIGHT: 187.39 LBS | TEMPERATURE: 98.1 F | OXYGEN SATURATION: 100 % | HEART RATE: 72 BPM | BODY MASS INDEX: 28.4 KG/M2

## 2023-06-26 DIAGNOSIS — G40.919 BREAKTHROUGH SEIZURE (HCC): Primary | ICD-10-CM

## 2023-06-26 LAB
ALBUMIN SERPL-MCNC: 4.1 G/DL (ref 3.4–5)
ALBUMIN/GLOB SERPL: 1.2 {RATIO} (ref 1.1–2.2)
ALP SERPL-CCNC: 74 U/L (ref 40–129)
ALT SERPL-CCNC: 9 U/L (ref 10–40)
ANION GAP SERPL CALCULATED.3IONS-SCNC: 12 MMOL/L (ref 3–16)
AST SERPL-CCNC: 15 U/L (ref 15–37)
BASOPHILS # BLD: 0 K/UL (ref 0–0.2)
BASOPHILS NFR BLD: 0.4 %
BILIRUB SERPL-MCNC: 0.3 MG/DL (ref 0–1)
BUN SERPL-MCNC: 12 MG/DL (ref 7–20)
CALCIUM SERPL-MCNC: 9 MG/DL (ref 8.3–10.6)
CHLORIDE SERPL-SCNC: 101 MMOL/L (ref 99–110)
CO2 SERPL-SCNC: 24 MMOL/L (ref 21–32)
CREAT SERPL-MCNC: 1 MG/DL (ref 0.6–1.1)
DEPRECATED RDW RBC AUTO: 14.9 % (ref 12.4–15.4)
EOSINOPHIL # BLD: 0.1 K/UL (ref 0–0.6)
EOSINOPHIL NFR BLD: 2.1 %
GFR SERPLBLD CREATININE-BSD FMLA CKD-EPI: >60 ML/MIN/{1.73_M2}
GLUCOSE SERPL-MCNC: 85 MG/DL (ref 70–99)
HCT VFR BLD AUTO: 37.1 % (ref 36–48)
HGB BLD-MCNC: 11.7 G/DL (ref 12–16)
LEVETIRACETAM SERPL-MCNC: 22.2 UG/ML (ref 6–46)
LYMPHOCYTES # BLD: 2.1 K/UL (ref 1–5.1)
LYMPHOCYTES NFR BLD: 39.4 %
MCH RBC QN AUTO: 23.9 PG (ref 26–34)
MCHC RBC AUTO-ENTMCNC: 31.7 G/DL (ref 31–36)
MCV RBC AUTO: 75.4 FL (ref 80–100)
MEDICATION DOSE-MCNC: NORMAL
MONOCYTES # BLD: 0.4 K/UL (ref 0–1.3)
MONOCYTES NFR BLD: 8 %
NEUTROPHILS # BLD: 2.6 K/UL (ref 1.7–7.7)
NEUTROPHILS NFR BLD: 50.1 %
PLATELET # BLD AUTO: 186 K/UL (ref 135–450)
PMV BLD AUTO: 8.2 FL (ref 5–10.5)
POTASSIUM SERPL-SCNC: 3.9 MMOL/L (ref 3.5–5.1)
PROT SERPL-MCNC: 7.6 G/DL (ref 6.4–8.2)
RBC # BLD AUTO: 4.92 M/UL (ref 4–5.2)
SODIUM SERPL-SCNC: 137 MMOL/L (ref 136–145)
WBC # BLD AUTO: 5.3 K/UL (ref 4–11)

## 2023-06-26 PROCEDURE — 85025 COMPLETE CBC W/AUTO DIFF WBC: CPT

## 2023-06-26 PROCEDURE — 6370000000 HC RX 637 (ALT 250 FOR IP)

## 2023-06-26 PROCEDURE — 70450 CT HEAD/BRAIN W/O DYE: CPT

## 2023-06-26 PROCEDURE — 80175 DRUG SCREEN QUAN LAMOTRIGINE: CPT

## 2023-06-26 PROCEDURE — 80053 COMPREHEN METABOLIC PANEL: CPT

## 2023-06-26 PROCEDURE — 80177 DRUG SCRN QUAN LEVETIRACETAM: CPT

## 2023-06-26 PROCEDURE — 72125 CT NECK SPINE W/O DYE: CPT

## 2023-06-26 PROCEDURE — 99284 EMERGENCY DEPT VISIT MOD MDM: CPT

## 2023-06-26 RX ORDER — LORAZEPAM 1 MG/1
1 TABLET ORAL ONCE
Status: COMPLETED | OUTPATIENT
Start: 2023-06-26 | End: 2023-06-26

## 2023-06-26 RX ORDER — LORAZEPAM 1 MG/1
1 TABLET ORAL 2 TIMES DAILY
Qty: 6 TABLET | Refills: 0 | Status: SHIPPED | OUTPATIENT
Start: 2023-06-26 | End: 2023-06-29

## 2023-06-26 RX ADMIN — LORAZEPAM 1 MG: 1 TABLET ORAL at 19:42

## 2023-06-26 ASSESSMENT — PAIN DESCRIPTION - PAIN TYPE: TYPE: ACUTE PAIN

## 2023-06-26 ASSESSMENT — PAIN DESCRIPTION - DESCRIPTORS: DESCRIPTORS: THROBBING

## 2023-06-26 ASSESSMENT — PAIN - FUNCTIONAL ASSESSMENT
PAIN_FUNCTIONAL_ASSESSMENT: 0-10
PAIN_FUNCTIONAL_ASSESSMENT: NONE - DENIES PAIN
PAIN_FUNCTIONAL_ASSESSMENT: NONE - DENIES PAIN

## 2023-06-26 ASSESSMENT — PAIN DESCRIPTION - LOCATION: LOCATION: HEAD;NECK

## 2023-06-26 ASSESSMENT — PAIN SCALES - GENERAL: PAINLEVEL_OUTOF10: 9

## 2023-06-28 LAB — LAMOTRIGINE SERPL-MCNC: 10.5 UG/ML (ref 3–15)

## 2023-07-14 ASSESSMENT — ENCOUNTER SYMPTOMS
COUGH: 0
BACK PAIN: 0
RHINORRHEA: 0
CONSTIPATION: 0
EYE PAIN: 0
DIARRHEA: 0
NAUSEA: 0
SHORTNESS OF BREATH: 0
ABDOMINAL PAIN: 0
SORE THROAT: 0
VOMITING: 0

## 2024-09-24 RX ORDER — LORAZEPAM 1 MG/1
1 TABLET ORAL 2 TIMES DAILY
Status: ON HOLD | COMMUNITY
End: 2024-10-01 | Stop reason: HOSPADM

## 2024-09-24 RX ORDER — DULOXETIN HYDROCHLORIDE 60 MG/1
60 CAPSULE, DELAYED RELEASE ORAL NIGHTLY
COMMUNITY

## 2024-09-24 RX ORDER — LEVETIRACETAM 1000 MG/1
2000 TABLET ORAL 2 TIMES DAILY
Status: ON HOLD | COMMUNITY
End: 2024-10-01 | Stop reason: HOSPADM

## 2024-09-24 RX ORDER — HYDROXYZINE HYDROCHLORIDE 25 MG/1
50 TABLET, FILM COATED ORAL 2 TIMES DAILY PRN
Status: ON HOLD | COMMUNITY
End: 2024-10-01 | Stop reason: HOSPADM

## 2024-09-24 RX ORDER — MIDAZOLAM 5 MG/.1ML
SPRAY NASAL PRN
Status: ON HOLD | COMMUNITY
End: 2024-10-01 | Stop reason: HOSPADM

## 2024-09-24 RX ORDER — ARIPIPRAZOLE 15 MG/1
15 TABLET ORAL DAILY
Status: ON HOLD | COMMUNITY
End: 2024-10-01 | Stop reason: HOSPADM

## 2024-09-24 RX ORDER — UREA 10 %
500 LOTION (ML) TOPICAL DAILY
COMMUNITY

## 2024-09-24 RX ORDER — TRAZODONE HYDROCHLORIDE 50 MG/1
50 TABLET, FILM COATED ORAL NIGHTLY
COMMUNITY

## 2024-09-24 RX ORDER — LACOSAMIDE 100 MG/1
100 TABLET ORAL 2 TIMES DAILY
Status: ON HOLD | COMMUNITY
End: 2024-10-01 | Stop reason: HOSPADM

## 2024-09-24 RX ORDER — CETIRIZINE HYDROCHLORIDE, PSEUDOEPHEDRINE HYDROCHLORIDE 5; 120 MG/1; MG/1
1 TABLET, FILM COATED, EXTENDED RELEASE ORAL 2 TIMES DAILY
COMMUNITY

## 2024-09-24 RX ORDER — NAPROXEN 500 MG/1
500 TABLET ORAL 2 TIMES DAILY WITH MEALS
Status: ON HOLD | COMMUNITY
End: 2024-10-01 | Stop reason: HOSPADM

## 2024-09-30 ENCOUNTER — ANESTHESIA EVENT (OUTPATIENT)
Dept: OPERATING ROOM | Age: 54
End: 2024-09-30
Payer: MEDICAID

## 2024-09-30 ENCOUNTER — APPOINTMENT (OUTPATIENT)
Dept: GENERAL RADIOLOGY | Age: 54
End: 2024-09-30
Attending: PODIATRIST
Payer: MEDICAID

## 2024-09-30 ENCOUNTER — ANESTHESIA (OUTPATIENT)
Dept: OPERATING ROOM | Age: 54
End: 2024-09-30
Payer: MEDICAID

## 2024-09-30 ENCOUNTER — HOSPITAL ENCOUNTER (OUTPATIENT)
Age: 54
Setting detail: OBSERVATION
Discharge: HOME | End: 2024-10-02
Attending: PODIATRIST | Admitting: INTERNAL MEDICINE
Payer: MEDICAID

## 2024-09-30 DIAGNOSIS — T84.84XD: Primary | ICD-10-CM

## 2024-09-30 DIAGNOSIS — Z86.69 HISTORY OF SEIZURE DISORDER: ICD-10-CM

## 2024-09-30 PROBLEM — Z51.89 ENCOUNTER FOR POSTANESTHESIA CARE: Status: ACTIVE | Noted: 2024-09-30

## 2024-09-30 LAB
GLUCOSE BLD-MCNC: 119 MG/DL (ref 70–99)
PERFORMED ON: ABNORMAL

## 2024-09-30 PROCEDURE — C1713 ANCHOR/SCREW BN/BN,TIS/BN: HCPCS | Performed by: PODIATRIST

## 2024-09-30 PROCEDURE — G0378 HOSPITAL OBSERVATION PER HR: HCPCS

## 2024-09-30 PROCEDURE — 3600000004 HC SURGERY LEVEL 4 BASE: Performed by: PODIATRIST

## 2024-09-30 PROCEDURE — 6360000002 HC RX W HCPCS: Performed by: STUDENT IN AN ORGANIZED HEALTH CARE EDUCATION/TRAINING PROGRAM

## 2024-09-30 PROCEDURE — 2709999900 HC NON-CHARGEABLE SUPPLY: Performed by: PODIATRIST

## 2024-09-30 PROCEDURE — 7100000001 HC PACU RECOVERY - ADDTL 15 MIN: Performed by: PODIATRIST

## 2024-09-30 PROCEDURE — 64447 NJX AA&/STRD FEMORAL NRV IMG: CPT | Performed by: STUDENT IN AN ORGANIZED HEALTH CARE EDUCATION/TRAINING PROGRAM

## 2024-09-30 PROCEDURE — 7100000000 HC PACU RECOVERY - FIRST 15 MIN: Performed by: PODIATRIST

## 2024-09-30 PROCEDURE — 3700000001 HC ADD 15 MINUTES (ANESTHESIA): Performed by: PODIATRIST

## 2024-09-30 PROCEDURE — 6360000002 HC RX W HCPCS: Performed by: NURSE ANESTHETIST, CERTIFIED REGISTERED

## 2024-09-30 PROCEDURE — 3700000000 HC ANESTHESIA ATTENDED CARE: Performed by: PODIATRIST

## 2024-09-30 PROCEDURE — 2500000003 HC RX 250 WO HCPCS: Performed by: NURSE ANESTHETIST, CERTIFIED REGISTERED

## 2024-09-30 PROCEDURE — 6370000000 HC RX 637 (ALT 250 FOR IP): Performed by: STUDENT IN AN ORGANIZED HEALTH CARE EDUCATION/TRAINING PROGRAM

## 2024-09-30 PROCEDURE — 73620 X-RAY EXAM OF FOOT: CPT

## 2024-09-30 PROCEDURE — 2580000003 HC RX 258: Performed by: PODIATRIST

## 2024-09-30 PROCEDURE — 6360000002 HC RX W HCPCS: Performed by: PODIATRIST

## 2024-09-30 PROCEDURE — 3600000014 HC SURGERY LEVEL 4 ADDTL 15MIN: Performed by: PODIATRIST

## 2024-09-30 PROCEDURE — 2580000003 HC RX 258: Performed by: INTERNAL MEDICINE

## 2024-09-30 PROCEDURE — 73630 X-RAY EXAM OF FOOT: CPT

## 2024-09-30 PROCEDURE — 64445 NJX AA&/STRD SCIATIC NRV IMG: CPT | Performed by: STUDENT IN AN ORGANIZED HEALTH CARE EDUCATION/TRAINING PROGRAM

## 2024-09-30 PROCEDURE — 2720000010 HC SURG SUPPLY STERILE: Performed by: PODIATRIST

## 2024-09-30 PROCEDURE — 2580000003 HC RX 258: Performed by: NURSE ANESTHETIST, CERTIFIED REGISTERED

## 2024-09-30 PROCEDURE — 1200000000 HC SEMI PRIVATE

## 2024-09-30 PROCEDURE — 6370000000 HC RX 637 (ALT 250 FOR IP): Performed by: INTERNAL MEDICINE

## 2024-09-30 PROCEDURE — A4217 STERILE WATER/SALINE, 500 ML: HCPCS | Performed by: PODIATRIST

## 2024-09-30 DEVICE — PATCH AMNION 2 LAYR PROTCT 4 X 4CM STERISHIELD II: Type: IMPLANTABLE DEVICE | Site: FOOT | Status: FUNCTIONAL

## 2024-09-30 DEVICE — IMPLANTABLE DEVICE
Type: IMPLANTABLE DEVICE | Site: FOOT | Status: FUNCTIONAL
Brand: EASYFUSE™

## 2024-09-30 DEVICE — GRAFT BNE W22XL20MM THK35X8MM BICORT EVANS WDG FOR OSTEOTMY: Type: IMPLANTABLE DEVICE | Site: FOOT | Status: FUNCTIONAL

## 2024-09-30 DEVICE — INJECTABLE KIT
Type: IMPLANTABLE DEVICE | Site: FOOT | Status: FUNCTIONAL
Brand: AUGMENT® INJECTABLE

## 2024-09-30 RX ORDER — FENTANYL CITRATE 50 UG/ML
100 INJECTION, SOLUTION INTRAMUSCULAR; INTRAVENOUS
Status: COMPLETED | OUTPATIENT
Start: 2024-09-30 | End: 2024-09-30

## 2024-09-30 RX ORDER — POLYETHYLENE GLYCOL 3350 17 G/17G
17 POWDER, FOR SOLUTION ORAL DAILY PRN
Status: CANCELLED | OUTPATIENT
Start: 2024-09-30

## 2024-09-30 RX ORDER — ROPIVACAINE HYDROCHLORIDE 5 MG/ML
INJECTION, SOLUTION EPIDURAL; INFILTRATION; PERINEURAL
Status: COMPLETED | OUTPATIENT
Start: 2024-09-30 | End: 2024-09-30

## 2024-09-30 RX ORDER — LANOLIN ALCOHOL/MO/W.PET/CERES
500 CREAM (GRAM) TOPICAL DAILY
Status: DISCONTINUED | OUTPATIENT
Start: 2024-09-30 | End: 2024-10-02 | Stop reason: HOSPADM

## 2024-09-30 RX ORDER — FENTANYL CITRATE 50 UG/ML
50 INJECTION, SOLUTION INTRAMUSCULAR; INTRAVENOUS EVERY 5 MIN PRN
Status: DISCONTINUED | OUTPATIENT
Start: 2024-09-30 | End: 2024-09-30 | Stop reason: HOSPADM

## 2024-09-30 RX ORDER — LIDOCAINE HYDROCHLORIDE 10 MG/ML
0.5 INJECTION, SOLUTION EPIDURAL; INFILTRATION; INTRACAUDAL; PERINEURAL ONCE
Status: DISCONTINUED | OUTPATIENT
Start: 2024-09-30 | End: 2024-09-30 | Stop reason: HOSPADM

## 2024-09-30 RX ORDER — ONDANSETRON 2 MG/ML
INJECTION INTRAMUSCULAR; INTRAVENOUS
Status: DISCONTINUED | OUTPATIENT
Start: 2024-09-30 | End: 2024-09-30 | Stop reason: SDUPTHER

## 2024-09-30 RX ORDER — SODIUM CHLORIDE 0.9 % (FLUSH) 0.9 %
5-40 SYRINGE (ML) INJECTION PRN
Status: DISCONTINUED | OUTPATIENT
Start: 2024-09-30 | End: 2024-09-30 | Stop reason: HOSPADM

## 2024-09-30 RX ORDER — SODIUM CHLORIDE 9 MG/ML
INJECTION, SOLUTION INTRAVENOUS CONTINUOUS
Status: DISCONTINUED | OUTPATIENT
Start: 2024-09-30 | End: 2024-09-30 | Stop reason: HOSPADM

## 2024-09-30 RX ORDER — MIDAZOLAM HYDROCHLORIDE 2 MG/2ML
2 INJECTION, SOLUTION INTRAMUSCULAR; INTRAVENOUS
Status: COMPLETED | OUTPATIENT
Start: 2024-09-30 | End: 2024-09-30

## 2024-09-30 RX ORDER — ENOXAPARIN SODIUM 100 MG/ML
40 INJECTION SUBCUTANEOUS DAILY
Status: CANCELLED | OUTPATIENT
Start: 2024-09-30

## 2024-09-30 RX ORDER — CETIRIZINE HYDROCHLORIDE, PSEUDOEPHEDRINE HYDROCHLORIDE 5; 120 MG/1; MG/1
1 TABLET, FILM COATED, EXTENDED RELEASE ORAL 2 TIMES DAILY
Status: DISCONTINUED | OUTPATIENT
Start: 2024-09-30 | End: 2024-09-30 | Stop reason: RX

## 2024-09-30 RX ORDER — ACETAMINOPHEN 325 MG/1
650 TABLET ORAL EVERY 6 HOURS PRN
Status: DISCONTINUED | OUTPATIENT
Start: 2024-09-30 | End: 2024-10-02 | Stop reason: HOSPADM

## 2024-09-30 RX ORDER — ACETAMINOPHEN 325 MG/1
650 TABLET ORAL EVERY 6 HOURS PRN
Status: CANCELLED | OUTPATIENT
Start: 2024-09-30

## 2024-09-30 RX ORDER — OXYCODONE AND ACETAMINOPHEN 5; 325 MG/1; MG/1
2 TABLET ORAL EVERY 4 HOURS PRN
Status: DISCONTINUED | OUTPATIENT
Start: 2024-09-30 | End: 2024-10-02 | Stop reason: HOSPADM

## 2024-09-30 RX ORDER — DULOXETIN HYDROCHLORIDE 30 MG/1
30 CAPSULE, DELAYED RELEASE ORAL DAILY
Status: DISCONTINUED | OUTPATIENT
Start: 2024-09-30 | End: 2024-09-30

## 2024-09-30 RX ORDER — SODIUM CHLORIDE 0.9 % (FLUSH) 0.9 %
5-40 SYRINGE (ML) INJECTION EVERY 12 HOURS SCHEDULED
Status: DISCONTINUED | OUTPATIENT
Start: 2024-09-30 | End: 2024-09-30 | Stop reason: HOSPADM

## 2024-09-30 RX ORDER — OXYCODONE HYDROCHLORIDE 5 MG/1
5 TABLET ORAL
Status: DISCONTINUED | OUTPATIENT
Start: 2024-09-30 | End: 2024-09-30 | Stop reason: HOSPADM

## 2024-09-30 RX ORDER — MAGNESIUM SULFATE IN WATER 40 MG/ML
2000 INJECTION, SOLUTION INTRAVENOUS PRN
Status: DISCONTINUED | OUTPATIENT
Start: 2024-09-30 | End: 2024-10-02 | Stop reason: HOSPADM

## 2024-09-30 RX ORDER — POTASSIUM CHLORIDE 7.45 MG/ML
10 INJECTION INTRAVENOUS PRN
Status: DISCONTINUED | OUTPATIENT
Start: 2024-09-30 | End: 2024-10-02 | Stop reason: HOSPADM

## 2024-09-30 RX ORDER — POTASSIUM CHLORIDE 1500 MG/1
40 TABLET, EXTENDED RELEASE ORAL PRN
Status: CANCELLED | OUTPATIENT
Start: 2024-09-30

## 2024-09-30 RX ORDER — PROPOFOL 10 MG/ML
INJECTION, EMULSION INTRAVENOUS
Status: DISCONTINUED | OUTPATIENT
Start: 2024-09-30 | End: 2024-09-30 | Stop reason: SDUPTHER

## 2024-09-30 RX ORDER — TRAZODONE HYDROCHLORIDE 50 MG/1
50 TABLET, FILM COATED ORAL NIGHTLY
Status: DISCONTINUED | OUTPATIENT
Start: 2024-09-30 | End: 2024-10-02 | Stop reason: HOSPADM

## 2024-09-30 RX ORDER — APREPITANT 40 MG/1
40 CAPSULE ORAL ONCE
Status: COMPLETED | OUTPATIENT
Start: 2024-09-30 | End: 2024-09-30

## 2024-09-30 RX ORDER — ACETAMINOPHEN 325 MG/1
650 TABLET ORAL ONCE
Status: COMPLETED | OUTPATIENT
Start: 2024-09-30 | End: 2024-09-30

## 2024-09-30 RX ORDER — POTASSIUM CHLORIDE 7.45 MG/ML
10 INJECTION INTRAVENOUS PRN
Status: CANCELLED | OUTPATIENT
Start: 2024-09-30

## 2024-09-30 RX ORDER — SODIUM CHLORIDE 0.9 % (FLUSH) 0.9 %
5-40 SYRINGE (ML) INJECTION EVERY 12 HOURS SCHEDULED
Status: CANCELLED | OUTPATIENT
Start: 2024-09-30

## 2024-09-30 RX ORDER — ARIPIPRAZOLE 5 MG/1
15 TABLET ORAL DAILY
Status: DISCONTINUED | OUTPATIENT
Start: 2024-09-30 | End: 2024-09-30

## 2024-09-30 RX ORDER — FENTANYL CITRATE 50 UG/ML
INJECTION, SOLUTION INTRAMUSCULAR; INTRAVENOUS
Status: DISCONTINUED | OUTPATIENT
Start: 2024-09-30 | End: 2024-09-30 | Stop reason: SDUPTHER

## 2024-09-30 RX ORDER — OXYCODONE AND ACETAMINOPHEN 5; 325 MG/1; MG/1
1 TABLET ORAL EVERY 4 HOURS PRN
Status: DISCONTINUED | OUTPATIENT
Start: 2024-09-30 | End: 2024-10-02 | Stop reason: HOSPADM

## 2024-09-30 RX ORDER — ACETAMINOPHEN 650 MG/1
650 SUPPOSITORY RECTAL EVERY 6 HOURS PRN
Status: CANCELLED | OUTPATIENT
Start: 2024-09-30

## 2024-09-30 RX ORDER — LACOSAMIDE 200 MG/1
200 TABLET ORAL 2 TIMES DAILY
Status: ON HOLD | COMMUNITY
End: 2024-10-01 | Stop reason: HOSPADM

## 2024-09-30 RX ORDER — SODIUM CHLORIDE 9 MG/ML
INJECTION, SOLUTION INTRAVENOUS PRN
Status: CANCELLED | OUTPATIENT
Start: 2024-09-30

## 2024-09-30 RX ORDER — ONDANSETRON 2 MG/ML
4 INJECTION INTRAMUSCULAR; INTRAVENOUS EVERY 6 HOURS PRN
Status: DISCONTINUED | OUTPATIENT
Start: 2024-09-30 | End: 2024-10-02 | Stop reason: HOSPADM

## 2024-09-30 RX ORDER — SODIUM CHLORIDE 9 MG/ML
INJECTION, SOLUTION INTRAVENOUS PRN
Status: DISCONTINUED | OUTPATIENT
Start: 2024-09-30 | End: 2024-10-02 | Stop reason: HOSPADM

## 2024-09-30 RX ORDER — ONDANSETRON 2 MG/ML
4 INJECTION INTRAMUSCULAR; INTRAVENOUS EVERY 6 HOURS PRN
Status: CANCELLED | OUTPATIENT
Start: 2024-09-30

## 2024-09-30 RX ORDER — SODIUM CHLORIDE 0.9 % (FLUSH) 0.9 %
5-40 SYRINGE (ML) INJECTION PRN
Status: CANCELLED | OUTPATIENT
Start: 2024-09-30

## 2024-09-30 RX ORDER — MAGNESIUM SULFATE IN WATER 40 MG/ML
2000 INJECTION, SOLUTION INTRAVENOUS PRN
Status: CANCELLED | OUTPATIENT
Start: 2024-09-30

## 2024-09-30 RX ORDER — ACETAMINOPHEN 650 MG/1
650 SUPPOSITORY RECTAL EVERY 6 HOURS PRN
Status: DISCONTINUED | OUTPATIENT
Start: 2024-09-30 | End: 2024-10-02 | Stop reason: HOSPADM

## 2024-09-30 RX ORDER — SODIUM CHLORIDE 9 MG/ML
INJECTION, SOLUTION INTRAVENOUS PRN
Status: DISCONTINUED | OUTPATIENT
Start: 2024-09-30 | End: 2024-09-30 | Stop reason: HOSPADM

## 2024-09-30 RX ORDER — METHOCARBAMOL 100 MG/ML
INJECTION, SOLUTION INTRAMUSCULAR; INTRAVENOUS
Status: DISCONTINUED | OUTPATIENT
Start: 2024-09-30 | End: 2024-09-30 | Stop reason: SDUPTHER

## 2024-09-30 RX ORDER — ARIPIPRAZOLE 960 MG/3.2ML
960 INJECTION, SUSPENSION, EXTENDED RELEASE INTRAMUSCULAR SEE ADMIN INSTRUCTIONS
COMMUNITY

## 2024-09-30 RX ORDER — MAGNESIUM HYDROXIDE 1200 MG/15ML
LIQUID ORAL CONTINUOUS PRN
Status: COMPLETED | OUTPATIENT
Start: 2024-09-30 | End: 2024-09-30

## 2024-09-30 RX ORDER — LEVETIRACETAM 500 MG/1
2000 TABLET ORAL 2 TIMES DAILY
Status: DISCONTINUED | OUTPATIENT
Start: 2024-09-30 | End: 2024-09-30

## 2024-09-30 RX ORDER — SODIUM CHLORIDE, SODIUM LACTATE, POTASSIUM CHLORIDE, CALCIUM CHLORIDE 600; 310; 30; 20 MG/100ML; MG/100ML; MG/100ML; MG/100ML
INJECTION, SOLUTION INTRAVENOUS
Status: DISCONTINUED | OUTPATIENT
Start: 2024-09-30 | End: 2024-09-30 | Stop reason: SDUPTHER

## 2024-09-30 RX ORDER — ENOXAPARIN SODIUM 100 MG/ML
40 INJECTION SUBCUTANEOUS DAILY
Status: DISCONTINUED | OUTPATIENT
Start: 2024-09-30 | End: 2024-10-02 | Stop reason: HOSPADM

## 2024-09-30 RX ORDER — HYDROMORPHONE HYDROCHLORIDE 2 MG/ML
0.5 INJECTION, SOLUTION INTRAMUSCULAR; INTRAVENOUS; SUBCUTANEOUS EVERY 5 MIN PRN
Status: DISCONTINUED | OUTPATIENT
Start: 2024-09-30 | End: 2024-09-30 | Stop reason: HOSPADM

## 2024-09-30 RX ORDER — SODIUM CHLORIDE 0.9 % (FLUSH) 0.9 %
5-40 SYRINGE (ML) INJECTION EVERY 12 HOURS SCHEDULED
Status: DISCONTINUED | OUTPATIENT
Start: 2024-09-30 | End: 2024-10-02 | Stop reason: HOSPADM

## 2024-09-30 RX ORDER — NALOXONE HYDROCHLORIDE 0.4 MG/ML
INJECTION, SOLUTION INTRAMUSCULAR; INTRAVENOUS; SUBCUTANEOUS PRN
Status: DISCONTINUED | OUTPATIENT
Start: 2024-09-30 | End: 2024-09-30 | Stop reason: HOSPADM

## 2024-09-30 RX ORDER — LIDOCAINE HYDROCHLORIDE 20 MG/ML
INJECTION, SOLUTION INFILTRATION; PERINEURAL
Status: DISCONTINUED | OUTPATIENT
Start: 2024-09-30 | End: 2024-09-30 | Stop reason: SDUPTHER

## 2024-09-30 RX ORDER — LACOSAMIDE 100 MG/1
100 TABLET ORAL 2 TIMES DAILY
Status: DISCONTINUED | OUTPATIENT
Start: 2024-09-30 | End: 2024-09-30

## 2024-09-30 RX ORDER — DULOXETIN HYDROCHLORIDE 60 MG/1
60 CAPSULE, DELAYED RELEASE ORAL NIGHTLY
Status: DISCONTINUED | OUTPATIENT
Start: 2024-09-30 | End: 2024-09-30

## 2024-09-30 RX ORDER — ONDANSETRON 2 MG/ML
4 INJECTION INTRAMUSCULAR; INTRAVENOUS
Status: DISCONTINUED | OUTPATIENT
Start: 2024-09-30 | End: 2024-09-30 | Stop reason: HOSPADM

## 2024-09-30 RX ORDER — POLYETHYLENE GLYCOL 3350 17 G/17G
17 POWDER, FOR SOLUTION ORAL DAILY PRN
Status: DISCONTINUED | OUTPATIENT
Start: 2024-09-30 | End: 2024-10-02 | Stop reason: HOSPADM

## 2024-09-30 RX ORDER — SODIUM CHLORIDE, SODIUM LACTATE, POTASSIUM CHLORIDE, CALCIUM CHLORIDE 600; 310; 30; 20 MG/100ML; MG/100ML; MG/100ML; MG/100ML
INJECTION, SOLUTION INTRAVENOUS CONTINUOUS
Status: DISCONTINUED | OUTPATIENT
Start: 2024-09-30 | End: 2024-09-30 | Stop reason: HOSPADM

## 2024-09-30 RX ORDER — LORAZEPAM 1 MG/1
1 TABLET ORAL 2 TIMES DAILY
Status: DISCONTINUED | OUTPATIENT
Start: 2024-09-30 | End: 2024-09-30

## 2024-09-30 RX ORDER — ONDANSETRON 4 MG/1
4 TABLET, ORALLY DISINTEGRATING ORAL EVERY 8 HOURS PRN
Status: CANCELLED | OUTPATIENT
Start: 2024-09-30

## 2024-09-30 RX ORDER — DEXAMETHASONE SODIUM PHOSPHATE 4 MG/ML
INJECTION, SOLUTION INTRA-ARTICULAR; INTRALESIONAL; INTRAMUSCULAR; INTRAVENOUS; SOFT TISSUE
Status: DISCONTINUED | OUTPATIENT
Start: 2024-09-30 | End: 2024-09-30 | Stop reason: SDUPTHER

## 2024-09-30 RX ORDER — ONDANSETRON 4 MG/1
4 TABLET, ORALLY DISINTEGRATING ORAL EVERY 8 HOURS PRN
Status: DISCONTINUED | OUTPATIENT
Start: 2024-09-30 | End: 2024-10-02 | Stop reason: HOSPADM

## 2024-09-30 RX ORDER — POTASSIUM CHLORIDE 1500 MG/1
40 TABLET, EXTENDED RELEASE ORAL PRN
Status: DISCONTINUED | OUTPATIENT
Start: 2024-09-30 | End: 2024-10-02 | Stop reason: HOSPADM

## 2024-09-30 RX ORDER — VITAMIN B COMPLEX
2000 TABLET ORAL DAILY
Status: DISCONTINUED | OUTPATIENT
Start: 2024-09-30 | End: 2024-10-02 | Stop reason: HOSPADM

## 2024-09-30 RX ORDER — LACOSAMIDE 100 MG/1
200 TABLET ORAL 2 TIMES DAILY
Status: DISCONTINUED | OUTPATIENT
Start: 2024-09-30 | End: 2024-10-02 | Stop reason: HOSPADM

## 2024-09-30 RX ORDER — SODIUM CHLORIDE 0.9 % (FLUSH) 0.9 %
5-40 SYRINGE (ML) INJECTION PRN
Status: DISCONTINUED | OUTPATIENT
Start: 2024-09-30 | End: 2024-10-02 | Stop reason: HOSPADM

## 2024-09-30 RX ADMIN — APREPITANT 40 MG: 40 CAPSULE ORAL at 07:04

## 2024-09-30 RX ADMIN — METHOCARBAMOL 500 MG: 100 INJECTION INTRAMUSCULAR; INTRAVENOUS at 09:09

## 2024-09-30 RX ADMIN — TRAZODONE HYDROCHLORIDE 50 MG: 50 TABLET ORAL at 21:47

## 2024-09-30 RX ADMIN — ROPIVACAINE HYDROCHLORIDE 20 ML: 5 INJECTION, SOLUTION EPIDURAL; INFILTRATION; PERINEURAL at 07:10

## 2024-09-30 RX ADMIN — MIDAZOLAM 1 MG: 1 INJECTION INTRAMUSCULAR; INTRAVENOUS at 07:08

## 2024-09-30 RX ADMIN — CEFAZOLIN 2000 MG: 2 INJECTION, POWDER, FOR SOLUTION INTRAMUSCULAR; INTRAVENOUS at 07:43

## 2024-09-30 RX ADMIN — FENTANYL CITRATE 50 MCG: 50 INJECTION, SOLUTION INTRAMUSCULAR; INTRAVENOUS at 09:18

## 2024-09-30 RX ADMIN — PROPOFOL 200 MG: 10 INJECTION, EMULSION INTRAVENOUS at 07:45

## 2024-09-30 RX ADMIN — FENTANYL CITRATE 50 MCG: 50 INJECTION INTRAMUSCULAR; INTRAVENOUS at 07:08

## 2024-09-30 RX ADMIN — FENTANYL CITRATE 50 MCG: 50 INJECTION, SOLUTION INTRAMUSCULAR; INTRAVENOUS at 09:10

## 2024-09-30 RX ADMIN — ROPIVACAINE HYDROCHLORIDE 10 ML: 5 INJECTION, SOLUTION EPIDURAL; INFILTRATION; PERINEURAL at 07:14

## 2024-09-30 RX ADMIN — SODIUM CHLORIDE: 9 INJECTION, SOLUTION INTRAVENOUS at 06:16

## 2024-09-30 RX ADMIN — OXYCODONE HYDROCHLORIDE AND ACETAMINOPHEN 2 TABLET: 5; 325 TABLET ORAL at 22:45

## 2024-09-30 RX ADMIN — DEXAMETHASONE SODIUM PHOSPHATE 4 MG: 4 INJECTION, SOLUTION INTRAMUSCULAR; INTRAVENOUS at 07:54

## 2024-09-30 RX ADMIN — SODIUM CHLORIDE, POTASSIUM CHLORIDE, SODIUM LACTATE AND CALCIUM CHLORIDE: 600; 310; 30; 20 INJECTION, SOLUTION INTRAVENOUS at 09:16

## 2024-09-30 RX ADMIN — ONDANSETRON 4 MG: 2 INJECTION, SOLUTION INTRAMUSCULAR; INTRAVENOUS at 07:43

## 2024-09-30 RX ADMIN — LIDOCAINE HYDROCHLORIDE 50 MG: 20 INJECTION, SOLUTION INFILTRATION; PERINEURAL at 07:45

## 2024-09-30 RX ADMIN — SODIUM CHLORIDE, PRESERVATIVE FREE 10 ML: 5 INJECTION INTRAVENOUS at 21:47

## 2024-09-30 RX ADMIN — DULOXETINE HYDROCHLORIDE 90 MG: 60 CAPSULE, DELAYED RELEASE ORAL at 21:46

## 2024-09-30 RX ADMIN — LACOSAMIDE 200 MG: 100 TABLET, FILM COATED ORAL at 21:46

## 2024-09-30 RX ADMIN — PHENYLEPHRINE HYDROCHLORIDE 50 MCG: 10 INJECTION INTRAVENOUS at 08:35

## 2024-09-30 RX ADMIN — ACETAMINOPHEN 650 MG: 325 TABLET ORAL at 07:04

## 2024-09-30 ASSESSMENT — PAIN SCALES - GENERAL
PAINLEVEL_OUTOF10: 0
PAINLEVEL_OUTOF10: 3
PAINLEVEL_OUTOF10: 7

## 2024-09-30 ASSESSMENT — PAIN SCALES - WONG BAKER
WONGBAKER_NUMERICALRESPONSE: NO HURT

## 2024-09-30 ASSESSMENT — PAIN DESCRIPTION - LOCATION: LOCATION: FOOT

## 2024-09-30 ASSESSMENT — PAIN DESCRIPTION - ORIENTATION: ORIENTATION: LEFT

## 2024-09-30 ASSESSMENT — PAIN DESCRIPTION - DIRECTION: RADIATING_TOWARDS: LEG

## 2024-09-30 ASSESSMENT — ENCOUNTER SYMPTOMS: SHORTNESS OF BREATH: 0

## 2024-09-30 ASSESSMENT — LIFESTYLE VARIABLES: SMOKING_STATUS: 1

## 2024-09-30 ASSESSMENT — PAIN DESCRIPTION - DESCRIPTORS: DESCRIPTORS: SHARP;SORE

## 2024-09-30 ASSESSMENT — PAIN - FUNCTIONAL ASSESSMENT
PAIN_FUNCTIONAL_ASSESSMENT: PREVENTS OR INTERFERES WITH ALL ACTIVE AND SOME PASSIVE ACTIVITIES
PAIN_FUNCTIONAL_ASSESSMENT: 0-10

## 2024-09-30 ASSESSMENT — PAIN DESCRIPTION - ONSET: ONSET: PROGRESSIVE

## 2024-09-30 ASSESSMENT — PAIN DESCRIPTION - FREQUENCY: FREQUENCY: INTERMITTENT

## 2024-09-30 ASSESSMENT — PAIN DESCRIPTION - PAIN TYPE: TYPE: SURGICAL PAIN

## 2024-09-30 NOTE — H&P
Podiatric Surgery Same Day Pre-op H&P Update Note  Choco Wesley I have examined the patient and reviewed the original history and physical completed by Darcy Oropeza PA-C on 09/16/24 and find no relevant changes.        The nature of the procedure, possible complications, alternative forms of therapy, post-op course, and post-op goals have been explained to patient/patient family.  All questions have been answered. The consent has been signed.  Patient's surgical site has been marked.       José Miguel Agosto DPM   Podiatric Resident PGY-2  Pager [phone] or PerfectServe  9/30/2024, 6:44 AM

## 2024-09-30 NOTE — ANESTHESIA PRE PROCEDURE
Department of Anesthesiology  Preprocedure Note       Name:  Choco Wesley   Age:  54 y.o.  :  1970                                          MRN:  [id number]         Date:  2024      Surgeon: Surgeon(s):  Pranav Canada DPM    Procedure: Procedure(s):  REMOVAL OF PAINFUL RETAINED HARDWARE-LEFT FOOT  REVISION OF LAPIDUS ARTHRODESIS WITH GRAFT-LEFT FOOT, SECOND TARSOMETATARSAL JOINT ARTHRODESIS-LEFT FOOT, APPLICATION OF BELOW KNEE SPLINT-LEFT FOOT-POPLITEAL BLOCK-JENSEN    Medications prior to admission:   Prior to Admission medications    Medication Sig Start Date End Date Taking? Authorizing Provider   lacosamide (VIMPAT) 100 MG TABS tablet Take 1 tablet by mouth 2 times daily.   Yes ProviderHarsh MD   levETIRAcetam (KEPPRA) 1000 MG tablet Take 2 tablets by mouth 2 times daily   Yes ProviderHarsh MD   DULoxetine (CYMBALTA) 60 MG extended release capsule Take 1 capsule by mouth at bedtime   Yes ProviderHarsh MD   ARIPiprazole (ABILIFY) 15 MG tablet Take 1 tablet by mouth daily   Yes Provider, MD Harsh   LORazepam (ATIVAN) 1 MG tablet Take 1 tablet by mouth in the morning and at bedtime.   Yes ProviderHarsh MD   metFORMIN (GLUCOPHAGE) 500 MG tablet Take 1 tablet by mouth daily (with breakfast)   Yes Provider, MD Harsh   cetirizine-psuedoephedrine (ZYRTEC-D) 5-120 MG per extended release tablet Take 1 tablet by mouth 2 times daily   Yes ProviderHarsh MD   traZODone (DESYREL) 50 MG tablet Take 1 tablet by mouth nightly   Yes ProviderHarsh MD   vitamin B-12 (CYANOCOBALAMIN) 500 MCG tablet Take 1 tablet by mouth daily 1/2 tab daily   Yes ProviderHarsh MD   Cholecalciferol 50 MCG (2000) TABS Take 1 tablet by mouth daily   Yes ProviderHarsh MD   Brivaracetam 75 MG TABS Take 100 mg by mouth 2 times daily.   Yes ProviderHarsh MD   DULoxetine (CYMBALTA) 30 MG extended release capsule Take 1 capsule by mouth daily   Yes

## 2024-09-30 NOTE — ANESTHESIA PROCEDURE NOTES
Peripheral Block    Patient location during procedure: pre-op  Reason for block: post-op pain management and at surgeon's request  Start time: 9/30/2024 7:10 AM  End time: 9/30/2024 7:13 AM  Staffing  Performed: anesthesiologist   Anesthesiologist: Tasha Moran MD  Performed by: Tasha Moran MD  Authorized by: Tasha Moran MD    Preanesthetic Checklist  Completed: patient identified, IV checked, site marked, risks and benefits discussed, surgical/procedural consents, equipment checked, pre-op evaluation, timeout performed, anesthesia consent given, oxygen available, monitors applied/VS acknowledged, fire risk safety assessment completed and verbalized and blood product R/B/A discussed and consented  Peripheral Block   Patient position: right lateral decubitus  Prep: ChloraPrep  Provider prep: sterile gloves and mask  Patient monitoring: continuous pulse ox, IV access, frequent blood pressure checks, oxygen, responsive to questions and cardiac monitor  Block type: Sciatic  Popliteal  Laterality: left  Injection technique: single-shot  Guidance: ultrasound guided    Needle   Needle type: insulated echogenic nerve stimulator needle   Needle gauge: 20 G  Needle localization: ultrasound guidance  Needle length: 10 cm  Assessment   Injection assessment: negative aspiration for heme, no paresthesia on injection, local visualized surrounding nerve on ultrasound and no intravascular symptoms  Paresthesia pain: none  Slow fractionated injection: yes  Hemodynamics: stable  Outcomes: uncomplicated and patient tolerated procedure well    Medications Administered  ropivacaine (NAROPIN) injection 0.5% - Perineural   20 mL - 9/30/2024 7:10:00 AM

## 2024-09-30 NOTE — BRIEF OP NOTE
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]    Post-Op Diagnosis: Same       PROCEDURE(S):  Removal of painful retained hardware 20680  Revision of the Lapidus arthrodesis with graft 28297  Second tarsometatarsal joint arthrodesis 39692  Application of below knee splint 42239    Surgeon(s):  Pranav Canada DPM    Assistant:  Resident: José Miguel Agosto DPM    Anesthesia: General    Hemostasis: anatomic dissection and electrocautery, pneumatic ankle tourniquet at 250 for 114 minutes    Injectables: Pre-Op popliteal block see anesthesia note    Materials: 3-0 4 0 Vicryl, 4-0 Monocryl    Estimated Blood Loss (mL): Minimal    Complications: None    Specimens:   * No specimens in log *    Implants:  Implant Name Type Inv. Item Serial No.  Lot No. LRB No. Used Action   Z DUPLICATE USE [id number] GRAFT BONE TIB INJ 1.5CC ALLOGRFT AUGMENT - [id number]  Z DUPLICATE USE [id number] GRAFT BONE TIB INJ 1.5CC ALLOGRFT AUGMENT [id number] [employer] [place] [id number] Left 1 Implanted   PATCH AMNION 2 LAYR PROTCT 4 X 4CM STERISHIELD II - [id number]  PATCH AMNION 2 LAYR PROTCT 4 X 4CM STERISHIELD II [id number] BONE BANK ALLOGRAFTS-  Left 1 Implanted   GRAFT BNE [id number] BICORT MORALES WDG FOR OSTEOTMY - [id number]  GRAFT BNE [id number] BICORT MORALES WDG FOR OSTEOTMY [id number] [facility] [employer] [place] [id number] Left 1 Implanted   EASYFUSE STAPLE 25X25 NITINOL 2-LEG - [id number]  EASYFUSE STAPLE 25X25 NITINOL 2-LEG  [facility] [place] [id number] Left 1 Implanted   EASYFUSE STAPLE 25X20 NITINOL 2-LEG - [id number]  EASYFUSE STAPLE 25X20 NITINOL 2-LEG

## 2024-09-30 NOTE — ANESTHESIA PROCEDURE NOTES
Peripheral Block    Patient location during procedure: pre-op  Reason for block: post-op pain management and at surgeon's request  Start time: 9/30/2024 7:14 AM  End time: 9/30/2024 7:15 AM  Staffing  Performed: anesthesiologist   Anesthesiologist: Tasha Moran MD  Performed by: Tasha Moran MD  Authorized by: Tasha Moran MD    Preanesthetic Checklist  Completed: patient identified, IV checked, site marked, risks and benefits discussed, surgical/procedural consents, equipment checked, pre-op evaluation, timeout performed, anesthesia consent given, oxygen available, monitors applied/VS acknowledged, fire risk safety assessment completed and verbalized and blood product R/B/A discussed and consented  Peripheral Block   Patient position: supine  Prep: ChloraPrep  Provider prep: sterile gloves and mask  Patient monitoring: continuous pulse ox, IV access, frequent blood pressure checks, oxygen, responsive to questions and cardiac monitor  Block type: Femoral and Saphenous  Laterality: left  Injection technique: single-shot  Guidance: ultrasound guided    Needle   Needle type: insulated echogenic nerve stimulator needle   Needle gauge: 20 G  Needle localization: ultrasound guidance  Needle length: 10 cm  Assessment   Injection assessment: negative aspiration for heme, no paresthesia on injection, local visualized surrounding nerve on ultrasound and no intravascular symptoms  Paresthesia pain: none  Slow fractionated injection: yes  Hemodynamics: stable  Outcomes: uncomplicated and patient tolerated procedure well    Medications Administered  ropivacaine (NAROPIN) injection 0.5% - Perineural   10 mL - 9/30/2024 7:14:00 AM

## 2024-09-30 NOTE — ANESTHESIA POSTPROCEDURE EVALUATION
Department of Anesthesiology  Postprocedure Note    Patient: Choco Wesley  MRN: [id number]  YOB: 1970  Date of evaluation: 9/30/2024    Procedure Summary       Date: 09/30/24 Room / Location: [address]    Anesthesia Start: 0741 Anesthesia Stop: 1003    Procedures:       REMOVAL OF PAINFUL RETAINED HARDWARE-LEFT FOOT (Left: Foot)      REVISION OF LAPIDUS ARTHRODESIS WITH GRAFT-LEFT FOOT, SECOND TARSOMETATARSAL JOINT ARTHRODESIS-LEFT FOOT, APPLICATION OF BELOW KNEE SPLINT-LEFT FOOT-POPLITEAL BLOCK-JENSEN (Left: Foot) Diagnosis:       Nonunion after arthrodesis      Pain in bone fixation device, subsequent encounter      Hallux valgus of left foot      Secondary osteoarthritis of foot, left      (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    Lelia Phase I: Lelia Score: 10    Lelia Phase II:      Anesthesia Post Evaluation    Patient location during evaluation: bedside  Patient participation: complete - patient participated  Level of consciousness: awake and alert  Pain score: 1  Airway patency: patent  Nausea & Vomiting: no vomiting  Cardiovascular status: hemodynamically stable  Respiratory status: nonlabored ventilation  Hydration status: stable  Multimodal analgesia pain management approach  Pain management: adequate    No notable events documented.

## 2024-09-30 NOTE — OP NOTE
Dr. Pranav Canada in his private office within 5-7 days. The patient is to keep dressing clean, dry and intact at all times. The patient is to call if any complications occur.    This operative report was dictated on behalf of Dr. Pranav Canada DPM.    Findings: Satisfactory correction of the 1-2 intermetatarsal angle with satisfactory correction and lengthening of the iatrogenically shortened first metatarsal.  Satisfactory apposition of the second metatarsal cuneiform joint.     DISPO: S/p revision first metatarsal cuneiform joint fusion with graft implant and second metatarsal cuneiform joint fusion.  Patient will need admitted for observation due to known seizure disorder following anesthesia.  Consult to internal medicine placed for admission to hospital.  Dressing to remain clean dry and intact the patient is in house and follow-up with Dr. Pranav Canada Within 1 week in the outpatient setting, patient to be completely nonweightbearing to the operative leg.    José Miguel Agosto DPM   Podiatric Resident PGY-2  Pager [phone] or PerfectServe  9/30/2024, 12:56 PM

## 2024-10-01 PROCEDURE — 97116 GAIT TRAINING THERAPY: CPT

## 2024-10-01 PROCEDURE — 97535 SELF CARE MNGMENT TRAINING: CPT

## 2024-10-01 PROCEDURE — 97530 THERAPEUTIC ACTIVITIES: CPT

## 2024-10-01 PROCEDURE — 2580000003 HC RX 258: Performed by: INTERNAL MEDICINE

## 2024-10-01 PROCEDURE — G0378 HOSPITAL OBSERVATION PER HR: HCPCS

## 2024-10-01 PROCEDURE — 97162 PT EVAL MOD COMPLEX 30 MIN: CPT

## 2024-10-01 PROCEDURE — 6370000000 HC RX 637 (ALT 250 FOR IP): Performed by: INTERNAL MEDICINE

## 2024-10-01 PROCEDURE — 97166 OT EVAL MOD COMPLEX 45 MIN: CPT

## 2024-10-01 RX ORDER — OXYCODONE AND ACETAMINOPHEN 5; 325 MG/1; MG/1
1 TABLET ORAL EVERY 6 HOURS PRN
Qty: 20 TABLET | Refills: 0 | Status: SHIPPED | OUTPATIENT
Start: 2024-10-01 | End: 2024-10-06

## 2024-10-01 RX ORDER — LACOSAMIDE 200 MG/1
200 TABLET ORAL 2 TIMES DAILY
Qty: 60 TABLET | Refills: 0 | Status: SHIPPED | OUTPATIENT
Start: 2024-10-01 | End: 2024-10-31

## 2024-10-01 RX ADMIN — OXYCODONE HYDROCHLORIDE AND ACETAMINOPHEN 2 TABLET: 5; 325 TABLET ORAL at 15:55

## 2024-10-01 RX ADMIN — OXYCODONE HYDROCHLORIDE AND ACETAMINOPHEN 2 TABLET: 5; 325 TABLET ORAL at 08:25

## 2024-10-01 RX ADMIN — Medication 2000 UNITS: at 08:25

## 2024-10-01 RX ADMIN — DULOXETINE HYDROCHLORIDE 90 MG: 60 CAPSULE, DELAYED RELEASE ORAL at 22:35

## 2024-10-01 RX ADMIN — LACOSAMIDE 200 MG: 100 TABLET, FILM COATED ORAL at 08:23

## 2024-10-01 RX ADMIN — SODIUM CHLORIDE, PRESERVATIVE FREE 10 ML: 5 INJECTION INTRAVENOUS at 08:33

## 2024-10-01 RX ADMIN — Medication 500 MCG: at 08:23

## 2024-10-01 RX ADMIN — OXYCODONE HYDROCHLORIDE AND ACETAMINOPHEN 2 TABLET: 5; 325 TABLET ORAL at 03:32

## 2024-10-01 RX ADMIN — LACOSAMIDE 200 MG: 100 TABLET, FILM COATED ORAL at 22:35

## 2024-10-01 RX ADMIN — TRAZODONE HYDROCHLORIDE 50 MG: 50 TABLET ORAL at 22:35

## 2024-10-01 RX ADMIN — METFORMIN HYDROCHLORIDE 500 MG: 500 TABLET ORAL at 08:23

## 2024-10-01 ASSESSMENT — PAIN DESCRIPTION - DIRECTION
RADIATING_TOWARDS: LEG
RADIATING_TOWARDS: LEG

## 2024-10-01 ASSESSMENT — PAIN SCALES - GENERAL
PAINLEVEL_OUTOF10: 10
PAINLEVEL_OUTOF10: 10
PAINLEVEL_OUTOF10: 0
PAINLEVEL_OUTOF10: 10
PAINLEVEL_OUTOF10: 8
PAINLEVEL_OUTOF10: 2

## 2024-10-01 ASSESSMENT — PAIN DESCRIPTION - DESCRIPTORS
DESCRIPTORS: SORE;SHARP
DESCRIPTORS: ACHING
DESCRIPTORS: ACHING

## 2024-10-01 ASSESSMENT — PAIN DESCRIPTION - ONSET: ONSET: ON-GOING

## 2024-10-01 ASSESSMENT — PAIN DESCRIPTION - LOCATION
LOCATION: FOOT

## 2024-10-01 ASSESSMENT — PAIN DESCRIPTION - ORIENTATION
ORIENTATION: LEFT

## 2024-10-01 ASSESSMENT — PAIN - FUNCTIONAL ASSESSMENT: PAIN_FUNCTIONAL_ASSESSMENT: PREVENTS OR INTERFERES SOME ACTIVE ACTIVITIES AND ADLS

## 2024-10-01 ASSESSMENT — PAIN DESCRIPTION - FREQUENCY
FREQUENCY: INTERMITTENT
FREQUENCY: CONTINUOUS

## 2024-10-01 NOTE — PLAN OF CARE
Problem: Chronic Conditions and Co-morbidities  Goal: Patient's chronic conditions and co-morbidity symptoms are monitored and maintained or improved  10/1/2024 1020 by Kevin Izaguirre RN  Outcome: Progressing  10/1/2024 0241 by Marilee Edwards RN  Outcome: Progressing  Flowsheets (Taken 9/30/2024 2000)  Care Plan - Patient's Chronic Conditions and Co-Morbidity Symptoms are Monitored and Maintained or Improved: Monitor and assess patient's chronic conditions and comorbid symptoms for stability, deterioration, or improvement     Problem: ABCDS Injury Assessment  Goal: Absence of physical injury  10/1/2024 1020 by Kevin Izaguirre RN  Outcome: Progressing  10/1/2024 0241 by Marilee Edwards RN  Outcome: Progressing     Problem: Pain  Goal: Verbalizes/displays adequate comfort level or baseline comfort level  10/1/2024 1020 by Kevin Izaguirre RN  Outcome: Progressing  10/1/2024 0241 by Marilee Edwards RN  Outcome: Progressing  Flowsheets  Taken 9/30/2024 2145 by Marilee Edwards RN  Verbalizes/displays adequate comfort level or baseline comfort level: Encourage patient to monitor pain and request assistance  Taken 9/30/2024 1705 by Kelin Crowell RN  Verbalizes/displays adequate comfort level or baseline comfort level:   Encourage patient to monitor pain and request assistance   Assess pain using appropriate pain scale   Administer analgesics based on type and severity of pain and evaluate response     Problem: Discharge Planning  Goal: Discharge to home or other facility with appropriate resources  10/1/2024 1020 by Kevin Izaguirre RN  Outcome: Progressing  Flowsheets (Taken 10/1/2024 0800)  Discharge to home or other facility with appropriate resources: Identify barriers to discharge with patient and caregiver  10/1/2024 0241 by Marilee Edwards RN  Outcome: Progressing  Flowsheets  Taken 9/30/2024 2000 by Marilee Edwards RN  Discharge to home or other facility with appropriate resources: Identify barriers to

## 2024-10-01 NOTE — DISCHARGE INSTR - COC
days: 1        Elimination:  Continence:   Bowel: {YES / NO:}  Bladder: {YES / NO:}  Urinary Catheter: {Urinary Catheter:576967840}   Colostomy/Ileostomy/Ileal Conduit: {YES / NO:}       Date of Last BM: ***    Intake/Output Summary (Last 24 hours) at 10/1/2024 1142  Last data filed at 10/1/2024 0333  Gross per 24 hour   Intake 1240 ml   Output 625 ml   Net 615 ml     I/O last 3 completed shifts:  In: 2240 [P.O.:1040; I.V.:1200]  Out: 625 [Urine:625]    Safety Concerns:     { CHARLES Safety Concerns:491656179}    Impairments/Disabilities:      { CHARLES Impairments/Disabilities:905714983}    Nutrition Therapy:  Current Nutrition Therapy:   { CHARLES Diet List:509277562}    Routes of Feeding: {[facility] D[facility] Other Feedings:351930058}  Liquids: {Slp liquid thickness:28486}  Daily Fluid Restriction: {[facility] D[facility] Yes amt example:204582124}  Last Modified Barium Swallow with Video (Video Swallowing Test): {Done Not Done Date:}    Treatments at the Time of Hospital Discharge:   Respiratory Treatments: ***  Oxygen Therapy:  {Therapy; copd oxygen:44447}  Ventilator:    { CC Vent List:531674621}    Rehab Therapies: {THERAPEUTIC INTERVENTION:9279860219}  Weight Bearing Status/Restrictions: {[facility] Weight Bearin}  Other Medical Equipment (for information only, NOT a DME order):  {EQUIPMENT:578081452}  Other Treatments: ***    Patient's personal belongings (please select all that are sent with patient):  {[facility] D[facility] Belongings:611786841}    RN SIGNATURE:  {Esignature:301911589}    CASE MANAGEMENT/SOCIAL WORK SECTION    Inpatient Status Date: n/a OBS    Readmission Risk Assessment Score:  Readmission Risk              Risk of Unplanned Readmission:  0           Discharging to Agency:    Discharging to Facility/ Agency   Name: [facility]  Address:   [address] [place] [id number]  Phone:  [phone]  Fax:  [phone]     / signature: Electronically signed by

## 2024-10-01 NOTE — ACP (ADVANCE CARE PLANNING)
Advanced Care Planning Note    Purpose of Encounter: Advanced care planning in light of postanesthesia care  Parties In Attendance: Patient, Madhu Andrews MD  Decisional Capacity: Yes  Subjective: Patient has pain at surgical site  Objective:   Goals of Care Determination: Patient wants full support  Plan:  PT/OT eval  Code Status: Full   Time spent on Advanced care Plannin minutes  Advanced Care Planning Documents: Completed advanced directives on chart, [place], is the Healthcare POA.    Madhu Andrews MD  10/1/2024 12:54 PM

## 2024-10-01 NOTE — PLAN OF CARE
Problem: Chronic Conditions and Co-morbidities  Goal: Patient's chronic conditions and co-morbidity symptoms are monitored and maintained or improved  Outcome: Progressing  Flowsheets (Taken 9/30/2024 2000)  Care Plan - Patient's Chronic Conditions and Co-Morbidity Symptoms are Monitored and Maintained or Improved: Monitor and assess patient's chronic conditions and comorbid symptoms for stability, deterioration, or improvement     Problem: ABCDS Injury Assessment  Goal: Absence of physical injury  Outcome: Progressing     Problem: Pain  Goal: Verbalizes/displays adequate comfort level or baseline comfort level  Outcome: Progressing  Flowsheets  Taken 9/30/2024 2145 by Marilee Edwards RN  Verbalizes/displays adequate comfort level or baseline comfort level: Encourage patient to monitor pain and request assistance  Taken 9/30/2024 1705 by Kelin Crowell RN  Verbalizes/displays adequate comfort level or baseline comfort level:   Encourage patient to monitor pain and request assistance   Assess pain using appropriate pain scale   Administer analgesics based on type and severity of pain and evaluate response     Problem: Discharge Planning  Goal: Discharge to home or other facility with appropriate resources  Outcome: Progressing  Flowsheets  Taken 9/30/2024 2000 by Marilee Edwards RN  Discharge to home or other facility with appropriate resources: Identify barriers to discharge with patient and caregiver  Taken 9/30/2024 1705 by Kelin Crowell RN  Discharge to home or other facility with appropriate resources: Identify barriers to discharge with patient and caregiver     Problem: Safety - Adult  Goal: Free from fall injury  Outcome: Progressing

## 2024-10-01 NOTE — DISCHARGE INSTRUCTIONS
Please call and make an appointment with your PCP within 1 week; If you do not have a PCP please make an appointment with the [facility] outpatient resident clinic  Please call and make an appointment with podiatry  Please take all your medications as prescribed including any new ones on discharge

## 2024-10-02 VITALS
BODY MASS INDEX: 31.07 KG/M2 | HEIGHT: 68 IN | HEART RATE: 74 BPM | SYSTOLIC BLOOD PRESSURE: 121 MMHG | WEIGHT: 205 LBS | TEMPERATURE: 98.6 F | OXYGEN SATURATION: 95 % | RESPIRATION RATE: 18 BRPM | DIASTOLIC BLOOD PRESSURE: 82 MMHG

## 2024-10-02 PROCEDURE — 97530 THERAPEUTIC ACTIVITIES: CPT

## 2024-10-02 PROCEDURE — 97535 SELF CARE MNGMENT TRAINING: CPT

## 2024-10-02 PROCEDURE — G0378 HOSPITAL OBSERVATION PER HR: HCPCS

## 2024-10-02 PROCEDURE — 6370000000 HC RX 637 (ALT 250 FOR IP): Performed by: INTERNAL MEDICINE

## 2024-10-02 PROCEDURE — 97116 GAIT TRAINING THERAPY: CPT

## 2024-10-02 RX ADMIN — METFORMIN HYDROCHLORIDE 500 MG: 500 TABLET ORAL at 08:53

## 2024-10-02 RX ADMIN — Medication 500 MCG: at 08:53

## 2024-10-02 RX ADMIN — LACOSAMIDE 200 MG: 100 TABLET, FILM COATED ORAL at 08:53

## 2024-10-02 RX ADMIN — OXYCODONE HYDROCHLORIDE AND ACETAMINOPHEN 2 TABLET: 5; 325 TABLET ORAL at 02:24

## 2024-10-02 RX ADMIN — Medication 2000 UNITS: at 08:54

## 2024-10-02 RX ADMIN — OXYCODONE HYDROCHLORIDE AND ACETAMINOPHEN 1 TABLET: 5; 325 TABLET ORAL at 11:03

## 2024-10-02 ASSESSMENT — PAIN SCALES - GENERAL
PAINLEVEL_OUTOF10: 0
PAINLEVEL_OUTOF10: 10
PAINLEVEL_OUTOF10: 0
PAINLEVEL_OUTOF10: 10
PAINLEVEL_OUTOF10: 0

## 2024-10-02 ASSESSMENT — PAIN SCALES - WONG BAKER: WONGBAKER_NUMERICALRESPONSE: NO HURT

## 2024-10-02 ASSESSMENT — PAIN DESCRIPTION - DESCRIPTORS
DESCRIPTORS: ACHING
DESCRIPTORS: ACHING

## 2024-10-02 ASSESSMENT — PAIN DESCRIPTION - ORIENTATION
ORIENTATION: LEFT
ORIENTATION: LEFT

## 2024-10-02 ASSESSMENT — PAIN DESCRIPTION - LOCATION
LOCATION: LEG
LOCATION: FOOT

## 2024-10-02 NOTE — PROGRESS NOTES
Admit Date: 9/30/2024  Diet: ADULT DIET; Regular; 4 carb choices (60 gm/meal)    CC: Post anesthesia care    Interval history:   Overnight, there were no acute events. Patient's vitals remained stable    Patient was seen this morning. I discussed the plan of the day with her in detail. All questions were addressed and answered to patient's satisfaction.   Patient denies fevers, chills, nausea, vomiting, chest pain, shortness of breath, diarrhea, constipation, dysuria, urinary frequency or urgency.     Plan:     -PT/OT  -Discharge per PT/OT recommendations  -Patient endorses she has nobody at home to help care for her and therefore she would be willing to leave the hospital tomorrow as she will have family at home  -Discharge order placed    Assessment:   Choco Wesley is a 54 y.o. female with PMH of  seizure disorder, type 2 diabetes on oral hypoglycemic agent with previous history of seizure  who was admitted with postanesthesia care    Postanesthesia care  Patient has a history of post anesthesia seizures and after her surgery with podiatry, she was admitted for observation prior to discharge.    Seizure  At home, patient is on Vimpat    T2DM  At home, patient is on metformin    Code Status: Full Code   FEN: ADULT DIET; Regular; 4 carb choices (60 gm/meal)   DVT PPX: [x]Lovenox, []Heparin, []Coumadin, []Eliquis, []Xarelto, []SCD  DISPO: Continue management of acute issues     Madhu Andrews MD  10/1/2024,  12:51 PM    This note was likely completed using voice recognition technology and may contain unintended errors.     Objective:   Vitals:   T-max:  Patient Vitals for the past 8 hrs:   BP Temp Temp src Pulse Resp SpO2   10/01/24 0800 103/64 97.7 °F (36.5 °C) Oral 75 -- 98 %   10/01/24 0548 114/78 97.8 °F (36.6 °C) Oral 72 16 98 %       Intake/Output Summary (Last 24 hours) at 10/1/2024 1251  Last data filed at 10/1/2024 0333  Gross per 24 hour   Intake 1240 ml   Output 625 ml   Net 615 ml       Physical 
      Hospital Medicine History & Physical      PCP: Keyla Francis MD    Date of Admission: 9/30/2024    Date of Service: Pt seen/examined on 9/30/2024 and  Placed in Observation.    Chief Complaint: Admission for observation with history of postanesthesia seizures      History Of Present Illness:    The patient is a 54 y.o. female with history of seizure disorder, type 2 diabetes on oral hypoglycemic agent with previous history of seizure related to postanesthesia.  Patient underwent removal of painful retained hardware of the left foot with podiatry.  Anesthesia requested patient be admitted for observation given prior history of postanesthesia seizures.  Otherwise unremarkable with no complaints.      Past Medical History:        Diagnosis Date    Anemia     Depression 09/16/2014    Diabetes mellitus (HCC)     type 2    Headache(784.0)     Schizophrenia (HCC)     Seizures ([facility])     LAST ONE 6/5/23       Past Surgical History:        Procedure Laterality Date    BRAIN SURGERY      2019    BUNIONECTOMY Left 02/24/2023    LAPIDUS BUNIONECTOMY LEFT FOOT performed by Linda Sales DPM at [facility] OR    HYSTERECTOMY (CERVIX STATUS UNKNOWN)      2021 per pt.    KNEE SURGERY Left     TUBAL LIGATION         Medications Prior to Admission:    Prior to Admission medications    Medication Sig Start Date End Date Taking? Authorizing Provider   lacosamide (VIMPAT) 100 MG TABS tablet Take 1 tablet by mouth 2 times daily.   Yes ProviderHarsh MD   levETIRAcetam (KEPPRA) 1000 MG tablet Take 2 tablets by mouth 2 times daily   Yes ProviderHarsh MD   DULoxetine (CYMBALTA) 60 MG extended release capsule Take 1 capsule by mouth at bedtime   Yes ProviderHarsh MD   ARIPiprazole (ABILIFY) 15 MG tablet Take 1 tablet by mouth daily   Yes ProviderHarsh MD   LORazepam (ATIVAN) 1 MG tablet Take 1 tablet by mouth in the morning and at bedtime.   Yes ProviderHarsh MD   metFORMIN (GLUCOPHAGE) 500 MG 
  DATE 9/30___      PLACE ___ [address]                          TIME _0730__                                             __x_ [address]                           Arrival _0530__     Surgeons office to give time ___      Surgery dates,times and arrival times are subject to change.Please check with your surgeon.  Bring a photo I.D.,insurance card and form of payment if you have a co pay.  Plan for someone 18 years or older to stay on site while you are her and to take you home after surgery.You are not permitted to drive yourself home. You cannot leave via Uber, Lyft, Taxi or Medical Transport by yourself. You must have someone with you. It is strongly suggested that someone stay with you the first 24 hours after anesthesia. Your surgery will be cancelled if you do not have a ride home. A parent or legal guardian guardian must stay with a child until the child is discharged. Please do not bring other children.  A History/Physical is required to be completed and dated within 30 days of your surgery. To be done by PCP _x_ Surgeon ___or Hospitalist ___  You may be required to get labs _x_ EKG __ or a chest Xray ___ prior to surgery. To be done by _done___  Nothing to eat or drink after midnight the evening prior to surgery.  If your surgeon requires a bowel prep, follow their instructions.  You may brush your teeth.  Bring a complete list of your medications including vitamins and supplement with the name,dose and frequency.  Take the following medications with a sip of water the AM of surgery _lacosamide, cymbalta, brivaracetam, abilify,levetiracetam___________________________________   [facility] [place] patients do not take any medications the AM of surgery.  If you can not be reached by phone to give specific medication instructions,you may use your inhalers,take your heart, blood pressure,seizure and thyroid medications with a sip of water the AM of surgery. Bring your rescue inhaler with you if you use one.  DO 
  [facility] - Inpatient Rehabilitation Department   Phone: [phone]    Physical Therapy    [] Initial Evaluation            [x] Daily Treatment Note         [] Discharge Summary      Patient: Choco Wesley   : 1970   MRN: [id number]   Date of Service:  10/2/2024  Admitting Diagnosis: Encounter for postanesthesia care  Current Admission Summary: s/p Removal of painful retained hardware   Revision of the Lapidus arthrodesis with graft   Second tarsometatarsal joint arthrodesis   Application of below knee splint  Past Medical History:  has a past medical history of Anemia, Depression, Diabetes mellitus (HCC), Headache(784.0), Schizophrenia (HCC), and Seizures (HCC).  Past Surgical History:  has a past surgical history that includes knee surgery (Left); Tubal ligation; brain surgery; Hysterectomy; Bunionectomy (Left, 2023); Foot surgery (Left, 2024); and Foot surgery (Left, 2024).  Discharge Recommendations: Choco Wesley scored a 19/24 on the AM-PAC short mobility form. Current research shows that an AM-PAC score of 18 or greater is typically associated with a discharge to the patient's home setting. Based on the patient's AM-PAC score and their current functional mobility deficits, it is recommended that the patient have 2-3 sessions per week of Physical Therapy at d/c to increase the patient's independence.  At this time, this patient demonstrates the endurance and safety to discharge home with  (home services) and a follow up treatment frequency of 2-3x/wk.  Please see assessment section for further patient specific details.  HOME HEALTH CARE: LEVEL 1 STANDARD    - Initial home health evaluation to occur within 24-48 hours, in patient home   - Therapy to evaluate with goal of regaining prior level of functioning   - Therapy to evaluate if patient has Home Health Aide needs for personal care      If patient discharges prior to next session this note will serve as a 
CLINICAL PHARMACY NOTE: MEDS TO BEDS    Total # of Prescriptions Filled: 1   The following medications were delivered to the patient:  OXYCODONE - ACETAMINOPHEN 5 - 325 TABS    Additional Documentation: Maria R VILLEGAS approved to deliver medications to patient room=signed  [facility] pharmacy recently filled lacosamide  [facility] Pharm[facility] Tech  
Physical Therapy    Attempted PT session this AM. Pt appears drowsy and asked for PT to return at later time. Attempted discussion on equipment at d/c. PT will return as schedule allows.    Dolores Noyola, PT, DPT, CNS [phone]    
Pt laying in bed resting. Calm and cooperative. Assessment completed. Is alert and oriented. Voids per BSC. VSS, on room air. No IV and care team aware. Tolerates a regular diet and swallows pills whole. Remains non weight baring on left leg. Left leg is wrapped in splint with ace wrap. Pt denies any pain or nausea. Call light in reach, will continue to monitor.   
Pt stable and able to be transferred from PACU to room . A&O , VSS, with no complaints at this time. Awaiting room on inpatient side     
Report called to 4T RN earnest. Awaiting clean room for transfer   
Shift assessment completed. Patient is A&O X4. Neuro WNL. AM medications have been given per MAR. Ace wrap and splint remains in the LLE. Ice pack placed in LLE. No drainage noted. Care plan and education have been reviewed. Seizure and standard precautions are in place.   
Shift assessment completed. VSS. Neuro WNL. Pt reported pain 10/10 to the left extremity. AM medication administered per MAR. Ace wrap and splint to LLE remains in place.  No drainage noted. Care plan and education has been reviewed and mutually agreed upon with the patient. Seizure and standard precautions are in place.   
Teaching / education initiated regarding perioperative experience, expectations, and pain management during stay. Patient verbalized understanding.   
Timeout completed at bedside with MEERA Rodriguez, anesthesia tech, and myself prior to nerve block @ 0707. O2 2L n/c in place. Pt pre-medicated with Versed and Fentanyl. VSS, NSR on monitor t/o. Pt tolerated procedure well.   
Transport here to take patient to room. All belongings with patient.  
(Complexity): medium complexity  Clinical Presentation: evolving      Subjective  General: Supine in bed upon arrival. Agreeable to therapy.   Pain: 10/10.  Location: \"15/10\" LLE  Pain Interventions: RN delivered pain medication within therapy session per patient request       Functional Mobility  Bed Mobility:  Supine to Sit: modified independent  Sit to Supine: modified independent  Comments:  Transfers:  Sit to stand transfer: stand by assistance  Stand to sit transfer: stand by assistance  Stand pivot transfer: stand by assistance  Toilet transfer: stand by assistance  Comments: stood from [facility] x 3-4 for ADL task, SPT from bed to recliner and w/c to bed  Ambulation:  Surface:level surface  Assistive Device: rolling walker  Assistance: stand by assistance  Distance: 15' + 30' + 15'  Gait Mechanics: steady, hop to gait pattern, decreased zayra, occasional standing rest breaks. Minimal verbal cues for WBing status.  Comments:    Stair Mobility:  Number of Steps: 4  Hand Rails: (L) ascending handrail  Assistance: contact guard assistance, minimal assistance  Comments: pt bumped up/down 4 steps with supervision. Patient able to sit on steps prior to bumping with use of 1 railing and CGA. Needed min A for standing at top of steps despite BUE on railing - pt states daughter and son can provide physical assistance. Attempted hopping up 1 step to assist to stand transfer and pt minimally WBing on LLE to accomplish with min A.   Wheelchair Mobility:  No w/c mobility completed on this date.  Comments:  Balance:  Static Sitting Balance: good: independent with functional balance in unsupported position  Dynamic Sitting Balance: good: independent with functional balance in unsupported position  Static Standing Balance: fair (-): maintains balance at SBA with use of UE support  Dynamic Standing Balance: fair (-): maintains balance at SBA with use of UE support  Comments:    Other Therapeutic Interventions  See OT note for 
  Functional Mobility  Functional Mobility Activity: to/from bathroom  Device Use: rolling walker  Required Assistance: stand by assistance  Comment: further mobility limited due to patient working with PT after session. Able to maintain NWBing on LLE   Balance:  Static Sitting Balance: good: independent with functional balance in unsupported position  Dynamic Sitting Balance: good: independent with functional balance in unsupported position  Static Standing Balance: fair (+): maintains balance at SBA/supervision without use of UE support  Dynamic Standing Balance: fair (-): maintains balance at SBA with use of UE support  Comments:    Other Therapeutic Interventions      Functional Outcomes  AM-PAC Inpatient Daily Activity Raw Score: 20                                    Cognition  WFL  Safety Judgement: decreased awareness of need for safety  Insights: decreased awareness of deficits  Comments: Flat affect, hx lobectomy, able to be redirected  Orientation:    alert and oriented x 4  Command Following:   WFL     Education  Barriers To Learning: none and cognition  Patient Education: patient educated on goals, OT role and benefits, plan of care, ADL adaptive strategies, IADL safety, transfer training, discharge recommendations  Learning Assessment:  patient verbalizes understanding, would benefit from continued reinforcement    Assessment  Activity Tolerance: Tolerated session well- after receiving pain medication   Impairments Requiring Therapeutic Intervention: decreased functional mobility, decreased ADL status, decreased strength, decreased safety awareness, decreased endurance, decreased balance, decreased IADL  Prognosis: good  Clinical Assessment: The patient is a 54 y.o. female who presents below their baseline level of function due to above deficits, associated with left foot surgery. Typically, pt is IND. Currently, pt is requiring SBA for mobility/transfers with RW, up to min A for step negotiation with 
balance at SBA/supervision without use of UE support  Comments:    Other Therapeutic Interventions    Min A for stair training per PT note. PT limited by NWB to LLE    Functional Outcomes  AM-PAC Inpatient Daily Activity Raw Score: 20                                    Cognition  WFL  Safety Judgement: decreased awareness of need for safety  Insights: decreased awareness of deficits  Comments: Flat affect, hx lobectomy, able to be redirected  Orientation:    alert and oriented x 4  Command Following:   WFL     Education  Barriers To Learning: none and cognition  Patient Education: patient educated on goals, OT role and benefits, plan of care, ADL adaptive strategies, IADL safety, transfer training, discharge recommendations  Learning Assessment:  patient verbalizes understanding, would benefit from continued reinforcement    Assessment  Activity Tolerance: Tolerated session well  Impairments Requiring Therapeutic Intervention: decreased functional mobility, decreased ADL status, decreased strength, decreased safety awareness, decreased endurance, decreased balance, decreased IADL  Prognosis: good  Clinical Assessment: The patient is a 54 y.o. female who presents below their baseline level of function due to above deficits, associated with Encounter for postanesthesia care. Typically, pt is IND. Currently, pt is requiring SBA for mobility/transfers with RW, up to min A for step negotiation with modified technique d/t NWB to LLE. Continued OT indicated in order to promote return to PLOF    Safety Interventions: patient left in bed, bed alarm in place, call light within reach, gait belt, and nurse notified    Plan  Frequency: 7 x/week  Current Treatment Recommendations: strengthening, balance training, functional mobility training, transfer training, endurance training, patient/caregiver education, ADL/self-care training, IADL training, and equipment evaluation/education    Goals    Short Term Goals:  Time Frame: by

## 2024-10-02 NOTE — PLAN OF CARE
Problem: Chronic Conditions and Co-morbidities  Goal: Patient's chronic conditions and co-morbidity symptoms are monitored and maintained or improved  10/2/2024 0952 by Kevin Izaguirre RN  Outcome: Progressing     Problem: ABCDS Injury Assessment  Goal: Absence of physical injury  10/2/2024 0952 by Kevin Izaguirre RN  Outcome: Progressing     Problem: Pain  Goal: Verbalizes/displays adequate comfort level or baseline comfort level  10/2/2024 0952 by Kevin Izaguirre RN  Outcome: Progressing     Problem: Discharge Planning  Goal: Discharge to home or other facility with appropriate resources  10/2/2024 0952 by Kevin Izaguirre RN  Outcome: Progressing       Problem: Safety - Adult  Goal: Free from fall injury  10/2/2024 0952 by Kevin Izaguirre RN  Outcome: Progressing     Problem: Skin/Tissue Integrity - Adult  Goal: Skin integrity remains intact  10/2/2024 0952 by Kevin Izaguirre RN  Outcome: Progressing     Problem: Skin/Tissue Integrity - Adult  Goal: Incisions, wounds, or drain sites healing without S/S of infection  Outcome: Progressing     Problem: Musculoskeletal - Adult  Goal: Return mobility to safest level of function  10/2/2024 0952 by Kevin Izaguirre RN  Outcome: Progressing     Problem: Musculoskeletal - Adult  Goal: Maintain proper alignment of affected body part  Outcome: Progressing     Problem: Musculoskeletal - Adult  Goal: Return ADL status to a safe level of function  Outcome: Progressing

## 2024-10-02 NOTE — PLAN OF CARE
Problem: Chronic Conditions and Co-morbidities  Goal: Patient's chronic conditions and co-morbidity symptoms are monitored and maintained or improved  10/1/2024 2228 by Osman Giron RN  Outcome: Progressing  10/1/2024 1020 by Kevin Izaguirre RN  Outcome: Progressing     Problem: ABCDS Injury Assessment  Goal: Absence of physical injury  10/1/2024 2228 by Osman Giron RN  Outcome: Progressing  10/1/2024 1020 by Kevin Izaguirre RN  Outcome: Progressing     Problem: Pain  Goal: Verbalizes/displays adequate comfort level or baseline comfort level  10/1/2024 2228 by Osman Giron RN  Outcome: Progressing  10/1/2024 1020 by Kevin Izaguirre RN  Outcome: Progressing     Problem: Discharge Planning  Goal: Discharge to home or other facility with appropriate resources  10/1/2024 2228 by Osman Giron RN  Outcome: Progressing  10/1/2024 1020 by Kevin Izaguirre RN  Outcome: Progressing  Flowsheets (Taken 10/1/2024 0800)  Discharge to home or other facility with appropriate resources: Identify barriers to discharge with patient and caregiver     Problem: Safety - Adult  Goal: Free from fall injury  10/1/2024 2228 by Osman Giron RN  Outcome: Progressing  10/1/2024 1020 by Kevin Izaguirre RN  Outcome: Progressing     Problem: Skin/Tissue Integrity - Adult  Goal: Skin integrity remains intact  10/1/2024 2228 by Osman Giron RN  Outcome: Progressing  10/1/2024 1020 by Kevin Izaguirre RN  Outcome: Progressing  Goal: Incisions, wounds, or drain sites healing without S/S of infection  10/1/2024 1020 by Kevin Izaguirre RN  Outcome: Progressing  Goal: Oral mucous membranes remain intact  10/1/2024 1020 by Kevin Izaguirre RN  Outcome: Progressing     Problem: Musculoskeletal - Adult  Goal: Return mobility to safest level of function  10/1/2024 2228 by Osman Giron RN  Outcome: Progressing  10/1/2024 1020 by Kevin Izaguirre RN  Outcome: Progressing  Goal: Maintain proper alignment of affected body

## 2024-10-02 NOTE — CARE COORDINATION
Case Management -  Discharge Note      Patient Name: Choco Wesley                   YOB: 1970            Readmission Risk (Low < 19, Mod (19-27), High > 27): Readmission Risk Score: 5.1    Current PCP: Keyla Francis MD      (IMM) Important Message from Medicare:    Has pt received appropriate IMM before discharge if required: N/A      PT AM-PAC: 19 /24  OT AM-PAC: 20 /24    Patient/patient representative has been educated on the benefits of home health care as well as the possible risks of declining recommended services. Patient/patient representative has acknowledged the information provided and decided on the following discharge plan. Patient/ patient representative has been provided freedom of choice regarding service provider, supported by basic dialogue that supports the patient's individualized plan of care/goals.    Home Care Information:   Is patient resuming current home health care services: Yes     Home Care Agency:   [facility]  [address] [place] [address]  Phone [phone]  Fax [phone]             Services: SN, PT, OT  Home Health Order Obtained: yes    Home health agency notified of discharge.      Financial    Payor: [employer] On license of [facility] / Plan: [employer] [place] / Product Type: *No Product type* /     Pharmacy:  Potential assistance Purchasing Medications:    Meds-to-Beds request: Yes      [id number] - [place], [place] - [address] [phone] - F [phone]  [address] [address]  Phone: [phone] Fax: [phone]    [facility] - [place], [place] - [address] - P [phone] - F [phone]  [address] [address]  Phone: [phone] Fax: [phone]    [id number] - [place], [place] - [address] - P [phone] - F [phone]  [address]

## 2024-10-03 NOTE — DISCHARGE SUMMARY
V2.0  Discharge Summary    Name:  Choco Wesley /Age/Sex: 1970 (54 y.o. female)   Admit Date: 2024  Discharging Provider: Madhu Andrews MD   MRN & CSN:  [id number] & [id number] Attending:  No att. providers found       Brief HPI: Choco Wesley is a 54 y.o. female with PMH of  seizure disorder, type 2 diabetes on oral hypoglycemic agent with previous history of seizure  who was admitted with postanesthesia care.  Patient has a history of seizure after anesthesia and after her surgery podiatry wanted her to be observed prior to discharge.  Patient improved did not have any seizures and was discharged home.    Brief Problem Focused Hospital Course:     Postanesthesia care  Patient has a history of post anesthesia seizures and after her surgery with podiatry, she was admitted for observation prior to discharge.     Seizure  At home, patient is on Vimpat     T2DM  At home, patient is on metformin    The patient expressed appropriate understanding of, and agreement with the discharge recommendations, medications, and plan.     Consults this admission:  IP CONSULT TO INTERNAL MEDICINE  IP CONSULT TO HOME CARE NEEDS    Discharge Instruction:   Primary care physician: Keyla Francis MD within 2 weeks  Disposition: Discharged to: [x]Home, []HHC, []SNF, []Acute Rehab, []Hospice []AMA  Condition on discharge: Stable    Physical Exam:   General appearance:  Alert and oriented, No apparent distress and cooperative.  HEENT Normal cephalic, atraumatic without obvious deformity.  Conjunctivae/corneas clear.  Neck: Supple, No jugular venous distention/bruits.    Lungs: Clear to auscultation, bilaterally without Rales/Wheezes/Rhonchi with good respiratory effort.  Heart: Regular rate and rhythm with Normal S1/S2 without murmurs, rubs or gallops  Abdomen: Soft, non-tender or non-distended without rigidity or guarding and positive bowel sounds  Extremities: No clubbing, cyanosis, or edema bilaterally.    Skin: 
6 hours as needed for Pain for up to 5 days. Max Daily Amount: 4 tablets, Disp-20 tablet, R-0Normal           Discharge Medication List as of 10/2/2024  1:08 PM        CONTINUE these medications which have CHANGED    Details   lacosamide (VIMPAT) 200 MG tablet Take 1 tablet by mouth 2 times daily for 30 days. Max Daily Amount: 400 mg, Disp-60 tablet, R-0Normal           Discharge Medication List as of 10/2/2024  1:08 PM        CONTINUE these medications which have NOT CHANGED    Details   ARIPiprazole ER (ABILIFY ASIMTUFII) 960 MG/3.2ML PRSY syringe Inject 960 mg into the muscle See Admin Instructions Every 60 daysHistorical Med      !! DULoxetine (CYMBALTA) 60 MG extended release capsule Take 1 capsule by mouth at bedtimeHistorical Med      metFORMIN (GLUCOPHAGE) 500 MG tablet Take 1 tablet by mouth daily (with breakfast)Historical Med      cetirizine-psuedoephedrine (ZYRTEC-D) 5-120 MG per extended release tablet Take 1 tablet by mouth 2 times dailyHistorical Med      traZODone (DESYREL) 50 MG tablet Take 1 tablet by mouth nightlyHistorical Med      vitamin B-12 (CYANOCOBALAMIN) 500 MCG tablet Take 1 tablet by mouth daily 1/2 tab dailyHistorical Med      Cholecalciferol 50 MCG (2000 UT) TABS Take 1 tablet by mouth dailyHistorical Med      !! DULoxetine (CYMBALTA) 30 MG extended release capsule Take 1 capsule by mouth dailyHistorical Med       !! - Potential duplicate medications found. Please discuss with provider.        Discharge Medication List as of 10/2/2024  1:08 PM        STOP taking these medications       levETIRAcetam (KEPPRA) 1000 MG tablet Comments:   Reason for Stopping:         ARIPiprazole (ABILIFY) 15 MG tablet Comments:   Reason for Stopping:         LORazepam (ATIVAN) 1 MG tablet Comments:   Reason for Stopping:         Midazolam (NAYZILAM) 5 MG/0.1ML SOLN Comments:   Reason for Stopping:         naproxen (NAPROSYN) 500 MG tablet Comments:   Reason for Stopping:         hydrOXYzine HCl (ATARAX)

## 2024-10-22 ENCOUNTER — APPOINTMENT (OUTPATIENT)
Dept: CT IMAGING | Age: 54
End: 2024-10-22
Payer: MEDICAID

## 2024-10-22 ENCOUNTER — HOSPITAL ENCOUNTER (EMERGENCY)
Age: 54
Discharge: HOME | End: 2024-10-22
Attending: EMERGENCY MEDICINE
Payer: MEDICAID

## 2024-10-22 VITALS
WEIGHT: 206.35 LBS | HEIGHT: 68 IN | BODY MASS INDEX: 31.27 KG/M2 | DIASTOLIC BLOOD PRESSURE: 86 MMHG | TEMPERATURE: 97.9 F | HEART RATE: 78 BPM | OXYGEN SATURATION: 95 % | SYSTOLIC BLOOD PRESSURE: 120 MMHG | RESPIRATION RATE: 14 BRPM

## 2024-10-22 DIAGNOSIS — R56.9 SEIZURE (HCC): Primary | ICD-10-CM

## 2024-10-22 LAB
ANION GAP SERPL CALCULATED.3IONS-SCNC: 12 MMOL/L (ref 3–16)
BASOPHILS # BLD: 0 K/UL (ref 0–0.2)
BASOPHILS NFR BLD: 0.9 %
BUN SERPL-MCNC: 15 MG/DL (ref 7–20)
CALCIUM SERPL-MCNC: 9.7 MG/DL (ref 8.3–10.6)
CHLORIDE SERPL-SCNC: 105 MMOL/L (ref 99–110)
CO2 SERPL-SCNC: 24 MMOL/L (ref 21–32)
CREAT SERPL-MCNC: 1.1 MG/DL (ref 0.6–1.1)
DEPRECATED RDW RBC AUTO: 14.5 % (ref 12.4–15.4)
EOSINOPHIL # BLD: 0.1 K/UL (ref 0–0.6)
EOSINOPHIL NFR BLD: 1.5 %
GFR SERPLBLD CREATININE-BSD FMLA CKD-EPI: 60 ML/MIN/{1.73_M2}
GLUCOSE SERPL-MCNC: 107 MG/DL (ref 70–99)
HCT VFR BLD AUTO: 40.6 % (ref 36–48)
HGB BLD-MCNC: 13 G/DL (ref 12–16)
LYMPHOCYTES # BLD: 2.6 K/UL (ref 1–5.1)
LYMPHOCYTES NFR BLD: 45.6 %
MCH RBC QN AUTO: 23.3 PG (ref 26–34)
MCHC RBC AUTO-ENTMCNC: 31.9 G/DL (ref 31–36)
MCV RBC AUTO: 72.9 FL (ref 80–100)
MONOCYTES # BLD: 0.5 K/UL (ref 0–1.3)
MONOCYTES NFR BLD: 8.1 %
NEUTROPHILS # BLD: 2.5 K/UL (ref 1.7–7.7)
NEUTROPHILS NFR BLD: 43.9 %
PLATELET # BLD AUTO: 274 K/UL (ref 135–450)
PMV BLD AUTO: 8.6 FL (ref 5–10.5)
POTASSIUM SERPL-SCNC: 3.9 MMOL/L (ref 3.5–5.1)
RBC # BLD AUTO: 5.56 M/UL (ref 4–5.2)
SODIUM SERPL-SCNC: 141 MMOL/L (ref 136–145)
WBC # BLD AUTO: 5.7 K/UL (ref 4–11)

## 2024-10-22 PROCEDURE — 80235 DRUG ASSAY LACOSAMIDE: CPT

## 2024-10-22 PROCEDURE — 70450 CT HEAD/BRAIN W/O DYE: CPT

## 2024-10-22 PROCEDURE — 85025 COMPLETE CBC W/AUTO DIFF WBC: CPT

## 2024-10-22 PROCEDURE — 6370000000 HC RX 637 (ALT 250 FOR IP): Performed by: EMERGENCY MEDICINE

## 2024-10-22 PROCEDURE — 80048 BASIC METABOLIC PNL TOTAL CA: CPT

## 2024-10-22 PROCEDURE — 93005 ELECTROCARDIOGRAM TRACING: CPT | Performed by: EMERGENCY MEDICINE

## 2024-10-22 PROCEDURE — 99284 EMERGENCY DEPT VISIT MOD MDM: CPT

## 2024-10-22 RX ORDER — ACETAMINOPHEN 500 MG
1000 TABLET ORAL ONCE
Status: COMPLETED | OUTPATIENT
Start: 2024-10-22 | End: 2024-10-22

## 2024-10-22 RX ADMIN — ACETAMINOPHEN 1000 MG: 500 TABLET ORAL at 20:46

## 2024-10-22 ASSESSMENT — PAIN DESCRIPTION - DESCRIPTORS
DESCRIPTORS: ACHING
DESCRIPTORS: ACHING
DESCRIPTORS: ACHING;DULL

## 2024-10-22 ASSESSMENT — PAIN DESCRIPTION - LOCATION
LOCATION: HEAD

## 2024-10-22 ASSESSMENT — ENCOUNTER SYMPTOMS
SHORTNESS OF BREATH: 0
GASTROINTESTINAL NEGATIVE: 1
ABDOMINAL PAIN: 0
RESPIRATORY NEGATIVE: 1
SORE THROAT: 0

## 2024-10-22 ASSESSMENT — PAIN DESCRIPTION - ORIENTATION
ORIENTATION: POSTERIOR
ORIENTATION: MID

## 2024-10-22 ASSESSMENT — PAIN DESCRIPTION - PAIN TYPE: TYPE: ACUTE PAIN

## 2024-10-22 ASSESSMENT — PAIN SCALES - GENERAL
PAINLEVEL_OUTOF10: 10
PAINLEVEL_OUTOF10: 4
PAINLEVEL_OUTOF10: 5

## 2024-10-22 ASSESSMENT — PAIN - FUNCTIONAL ASSESSMENT: PAIN_FUNCTIONAL_ASSESSMENT: ACTIVITIES ARE NOT PREVENTED

## 2024-10-22 NOTE — ED TRIAGE NOTES
Pt into ED from home via EMS after unwitnessed seizures. Per pt's daughter pt called 2x without response at approx 1800 this evening. Pt does not remember incident. C/o 10/10 posterior head pain. Hx of epilepsy. States she has been compliant with medications. Pt a/ox4, with delayed response time. Unable to recall how often seizures are occuring

## 2024-10-22 NOTE — ED PROVIDER NOTES
of motion.      Cervical back: Neck supple.   Skin:     General: Skin is warm and dry.      Capillary Refill: Capillary refill takes less than 2 seconds.   Neurological:      General: No focal deficit present.      Mental Status: She is alert and oriented to person, place, and time.      GCS: GCS eye subscore is 4. GCS verbal subscore is 5. GCS motor subscore is 6.      Cranial Nerves: No cranial nerve deficit, dysarthria or facial asymmetry.      Sensory: Sensation is intact. No sensory deficit.      Motor: Motor function is intact. No weakness, tremor, atrophy, abnormal muscle tone or pronator drift.      Coordination: Coordination is intact. Coordination normal. Finger-Nose-Finger Test and Heel to Shin Test normal.   Psychiatric:         Mood and Affect: Mood normal.         Behavior: Behavior normal.         DIAGNOSTIC RESULTS   LABS:    Labs Reviewed   CBC WITH AUTO DIFFERENTIAL - Abnormal; Notable for the following components:       Result Value    RBC 5.56 (*)     MCV 72.9 (*)     MCH 23.3 (*)     All other components within normal limits   BASIC METABOLIC PANEL W/ REFLEX TO MG FOR LOW K - Abnormal; Notable for the following components:    Glucose 107 (*)     Est, Glom Filt Rate 60 (*)     All other components within normal limits   LACOSAMIDE LEVEL       When ordered only abnormal lab results are displayed. All other labs were within normal range or not returned as of this dictation.    EKG:     EKG Interpretation    Interpreted by emergency department physician    Rhythm: normal sinus   Rate: normal  Axis: normal  Ectopy: none  Conduction: normal  ST Segments: normal  T Waves: Nonspecific changes  Q Waves: none    Clinical Impression: Normal sinus rhythm, no significant T wave or ST changes.  Normal TN interval, normal QRS duration, normal QT QTC.  Normal axis.  No significant change from previous EKG dated November 6, 2022.  Interpreted by myself.        RADIOLOGY:   Non-plain film images such as CT,

## 2024-10-23 LAB
EKG ATRIAL RATE: 84 BPM
EKG DIAGNOSIS: NORMAL
EKG P AXIS: 63 DEGREES
EKG P-R INTERVAL: 152 MS
EKG Q-T INTERVAL: 362 MS
EKG QRS DURATION: 70 MS
EKG QTC CALCULATION (BAZETT): 427 MS
EKG R AXIS: 32 DEGREES
EKG T AXIS: 70 DEGREES
EKG VENTRICULAR RATE: 84 BPM

## 2024-10-23 PROCEDURE — 93010 ELECTROCARDIOGRAM REPORT: CPT | Performed by: STUDENT IN AN ORGANIZED HEALTH CARE EDUCATION/TRAINING PROGRAM

## 2024-10-23 NOTE — ED NOTES

## 2024-10-24 LAB — LACOSAMIDE SERPL-MCNC: 9.5 UG/ML (ref 1–10)

## (undated) DEVICE — PODIATRY: Brand: MEDLINE INDUSTRIES, INC.

## (undated) DEVICE — 3M™ COBAN™ NL STERILE NON-LATEX SELF-ADHERENT WRAP, 2083S, 3 IN X 5 YD (7,5 CM X 4,5 M), 24 ROLLS/CASE: Brand: 3M™ COBAN™

## (undated) DEVICE — GLOVE ORTHO 8   MSG9480

## (undated) DEVICE — GLOVE SURG SZ 7 L12IN FNGR THK79MIL GRN LTX FREE

## (undated) DEVICE — ZIMMER® STERILE DISPOSABLE TOURNIQUET CUFF WITH PLC, DUAL PORT, SINGLE BLADDER, 18 IN. (46 CM)

## (undated) DEVICE — GLOVE SURG SZ 7 CRM LTX FREE POLYISOPRENE POLYMER BEAD ANTI

## (undated) DEVICE — INTENDED FOR TISSUE SEPARATION, AND OTHER PROCEDURES THAT REQUIRE A SHARP SURGICAL BLADE TO PUNCTURE OR CUT.: Brand: BARD-PARKER ® STAINLESS STEEL BLADES

## (undated) DEVICE — BANDAGE,GAUZE,BULKEE II,4.5"X4.1YD,STRL: Brand: MEDLINE

## (undated) DEVICE — PADDING CAST W4INXL4YD ST COT RAYON MICROPLEATED HIGHLY

## (undated) DEVICE — IMPLANTABLE DEVICE
Type: IMPLANTABLE DEVICE | Site: FOOT | Status: NON-FUNCTIONAL
Brand: EASYFUSE™
Removed: 2024-09-30

## (undated) DEVICE — SUTURE VICRYL SZ 3-0 L27IN ABSRB UD L26MM CT-2 1/2 CIR J232H

## (undated) DEVICE — C-ARM: Brand: UNBRANDED

## (undated) DEVICE — CANNULATED DRILL: Brand: FIXOS

## (undated) DEVICE — SOLUTION IV IRRIG POUR BRL 0.9% SODIUM CHL 2F7124

## (undated) DEVICE — K-WIRE DRILL: Brand: VARIAX

## (undated) DEVICE — THIN OFFSET (9.0 X 0.38 X 25.0MM)

## (undated) DEVICE — STOCKINETTE,IMPERVIOUS,12X48,STERILE: Brand: MEDLINE

## (undated) DEVICE — BIT DRL K WIRE 1.6X200 MM 2 MM FOR SCR VARIAX

## (undated) DEVICE — STRIP,CLOSURE,WOUND,MEDI-STRIP,1/4X3: Brand: MEDLINE

## (undated) DEVICE — APPLICATOR MEDICATED 26 CC SOLUTION HI LT ORNG CHLORAPREP

## (undated) DEVICE — SYRINGE MED 10ML LUERLOCK TIP W/O SFTY DISP

## (undated) DEVICE — HOLDING PIN: Brand: ANCHORAGE

## (undated) DEVICE — GAUZE,SPONGE,4"X4",16PLY,XRAY,STRL,LF: Brand: MEDLINE

## (undated) DEVICE — HYPODERMIC SAFETY NEEDLE: Brand: MAGELLAN

## (undated) DEVICE — SUTURE VICRYL + SZ 4-0 L27IN ABSRB UD L26MM SH 1/2 CIR VCP415H

## (undated) DEVICE — SUTURE VCRL + SZ 3-0 L27IN ABSRB UD CT-2 L26MM 1/2 CIR TAPR VCP232H

## (undated) DEVICE — REAMER FOR CROSS-PLATES: Brand: ANCHORAGE

## (undated) DEVICE — GLOVE SURG SZ 65 THK91MIL LTX FREE SYN POLYISOPRENE

## (undated) DEVICE — PRECISION THIN (9.0 X 0.38 X 31.0MM)

## (undated) DEVICE — SPEEDGUIDE DRILL AO: Brand: VARIAX

## (undated) DEVICE — GAUZE,SPONGE,4"X4",8PLY,STRL,LF,10/TRAY: Brand: MEDLINE

## (undated) DEVICE — LOWER EXTREMITY: Brand: MEDLINE INDUSTRIES, INC.

## (undated) DEVICE — KIT INST PREP JOINT STERILE

## (undated) DEVICE — BANDAGE GZ W2XL75IN ST RAYON POLY CNFRM STRTCH LTWT

## (undated) DEVICE — GLOVE,SURG,SENSICARE SLT,LF,PF,6.5: Brand: MEDLINE

## (undated) DEVICE — ELECTRODE PT RET AD L9FT HI MOIST COND ADH HYDRGEL CORDED

## (undated) DEVICE — BNDG,ELSTC,MATRIX,STRL,6"X5YD,LF,HOOK&LP: Brand: MEDLINE

## (undated) DEVICE — GOWN SIRUS NONREIN LG W/TWL: Brand: MEDLINE INDUSTRIES, INC.

## (undated) DEVICE — SUTURE MONOCRYL + SZ 4-0 L27IN ABSRB UD L19MM PS-2 3/8 CIR MCP426H

## (undated) DEVICE — GOWN SIRUS NONREIN XL W/TWL: Brand: MEDLINE INDUSTRIES, INC.

## (undated) DEVICE — UNTHREADED GUIDE WIRE: Brand: FIXOS

## (undated) DEVICE — GLOVE SURG SZ 65 L12IN FNGR THK79MIL GRN LTX FREE

## (undated) DEVICE — SUTURE VCRL + SZ 4-0 L27IN ABSRB UD L26MM SH 1/2 CIR VCP415H

## (undated) DEVICE — EASYFUSE INSTRUMENT PACK MID / HINDFOOT

## (undated) DEVICE — LIQUIBAND RAPID ADHESIVE 36/CS 0.8ML: Brand: MEDLINE

## (undated) DEVICE — DRESSING,GAUZE,XEROFORM,CURAD,1"X8",ST: Brand: CURAD